# Patient Record
Sex: FEMALE | Race: WHITE | NOT HISPANIC OR LATINO | Employment: FULL TIME | URBAN - METROPOLITAN AREA
[De-identification: names, ages, dates, MRNs, and addresses within clinical notes are randomized per-mention and may not be internally consistent; named-entity substitution may affect disease eponyms.]

---

## 2023-02-28 LAB — URINE CULTURE: NORMAL

## 2023-03-22 DIAGNOSIS — F41.9 ANXIETY DISORDER, UNSPECIFIED TYPE: Primary | ICD-10-CM

## 2023-03-22 RX ORDER — FLUOXETINE 10 MG/1
10 CAPSULE ORAL DAILY
COMMUNITY
End: 2023-09-20 | Stop reason: SDUPTHER

## 2023-03-22 RX ORDER — FLUOXETINE 10 MG/1
CAPSULE ORAL
Qty: 30 CAPSULE | Refills: 0 | Status: SHIPPED | OUTPATIENT
Start: 2023-03-22 | End: 2023-04-20

## 2023-03-22 NOTE — TELEPHONE ENCOUNTER
Requested Prescriptions     Pending Prescriptions Disp Refills    FLUoxetine (PROzac) 10 mg capsule [Pharmacy Med Name: FLUoxetine HCl Oral Capsule 10 MG] 30 capsule 0     Sig: TAKE 1 CAPSULE BY MOUTH EVERY DAY

## 2023-04-19 DIAGNOSIS — F41.9 ANXIETY DISORDER, UNSPECIFIED TYPE: ICD-10-CM

## 2023-04-20 RX ORDER — FLUOXETINE 10 MG/1
CAPSULE ORAL
Qty: 30 CAPSULE | Refills: 0 | Status: SHIPPED | OUTPATIENT
Start: 2023-04-20 | End: 2023-05-23

## 2023-05-09 LAB
ALANINE AMINOTRANSFERASE (SGPT) (U/L) IN SER/PLAS: 17 U/L (ref 7–45)
ALBUMIN (G/DL) IN SER/PLAS: 4.1 G/DL (ref 3.4–5)
ALKALINE PHOSPHATASE (U/L) IN SER/PLAS: 69 U/L (ref 33–110)
ANION GAP IN SER/PLAS: 10 MMOL/L (ref 10–20)
ASPARTATE AMINOTRANSFERASE (SGOT) (U/L) IN SER/PLAS: 25 U/L (ref 9–39)
BASOPHILS (10*3/UL) IN BLOOD BY AUTOMATED COUNT: 0.03 X10E9/L (ref 0–0.1)
BASOPHILS/100 LEUKOCYTES IN BLOOD BY AUTOMATED COUNT: 0.4 % (ref 0–2)
BILIRUBIN TOTAL (MG/DL) IN SER/PLAS: 0.4 MG/DL (ref 0–1.2)
CALCIDIOL (25 OH VITAMIN D3) (NG/ML) IN SER/PLAS: 80 NG/ML
CALCIUM (MG/DL) IN SER/PLAS: 9.4 MG/DL (ref 8.6–10.3)
CARBON DIOXIDE, TOTAL (MMOL/L) IN SER/PLAS: 32 MMOL/L (ref 21–32)
CHLORIDE (MMOL/L) IN SER/PLAS: 101 MMOL/L (ref 98–107)
COBALAMIN (VITAMIN B12) (PG/ML) IN SER/PLAS: 2522 PG/ML (ref 211–911)
CREATININE (MG/DL) IN SER/PLAS: 0.69 MG/DL (ref 0.5–1.05)
EOSINOPHILS (10*3/UL) IN BLOOD BY AUTOMATED COUNT: 0.16 X10E9/L (ref 0–0.7)
EOSINOPHILS/100 LEUKOCYTES IN BLOOD BY AUTOMATED COUNT: 2.2 % (ref 0–6)
ERYTHROCYTE DISTRIBUTION WIDTH (RATIO) BY AUTOMATED COUNT: 12.4 % (ref 11.5–14.5)
ERYTHROCYTE MEAN CORPUSCULAR HEMOGLOBIN CONCENTRATION (G/DL) BY AUTOMATED: 33.1 G/DL (ref 32–36)
ERYTHROCYTE MEAN CORPUSCULAR VOLUME (FL) BY AUTOMATED COUNT: 95 FL (ref 80–100)
ERYTHROCYTES (10*6/UL) IN BLOOD BY AUTOMATED COUNT: 4.22 X10E12/L (ref 4–5.2)
FERRITIN (UG/LL) IN SER/PLAS: 171 UG/L (ref 8–150)
FOLATE (NG/ML) IN SER/PLAS: 14.9 NG/ML
GFR FEMALE: >90 ML/MIN/1.73M2
GLUCOSE (MG/DL) IN SER/PLAS: 93 MG/DL (ref 74–99)
HEMATOCRIT (%) IN BLOOD BY AUTOMATED COUNT: 40.2 % (ref 36–46)
HEMOGLOBIN (G/DL) IN BLOOD: 13.3 G/DL (ref 12–16)
IMMATURE GRANULOCYTES/100 LEUKOCYTES IN BLOOD BY AUTOMATED COUNT: 0.1 % (ref 0–0.9)
IRON (UG/DL) IN SER/PLAS: 85 UG/DL (ref 35–150)
IRON BINDING CAPACITY (UG/DL) IN SER/PLAS: 329 UG/DL (ref 240–445)
IRON SATURATION (%) IN SER/PLAS: 26 % (ref 25–45)
LEUKOCYTES (10*3/UL) IN BLOOD BY AUTOMATED COUNT: 7.3 X10E9/L (ref 4.4–11.3)
LYMPHOCYTES (10*3/UL) IN BLOOD BY AUTOMATED COUNT: 2.4 X10E9/L (ref 1.2–4.8)
LYMPHOCYTES/100 LEUKOCYTES IN BLOOD BY AUTOMATED COUNT: 33 % (ref 13–44)
MONOCYTES (10*3/UL) IN BLOOD BY AUTOMATED COUNT: 0.43 X10E9/L (ref 0.1–1)
MONOCYTES/100 LEUKOCYTES IN BLOOD BY AUTOMATED COUNT: 5.9 % (ref 2–10)
NEUTROPHILS (10*3/UL) IN BLOOD BY AUTOMATED COUNT: 4.25 X10E9/L (ref 1.2–7.7)
NEUTROPHILS/100 LEUKOCYTES IN BLOOD BY AUTOMATED COUNT: 58.4 % (ref 40–80)
PARATHYRIN INTACT (PG/ML) IN SER/PLAS: 53.2 PG/ML (ref 18.5–88)
PLATELETS (10*3/UL) IN BLOOD AUTOMATED COUNT: 294 X10E9/L (ref 150–450)
POTASSIUM (MMOL/L) IN SER/PLAS: 4 MMOL/L (ref 3.5–5.3)
PROTEIN TOTAL: 6.8 G/DL (ref 6.4–8.2)
SODIUM (MMOL/L) IN SER/PLAS: 139 MMOL/L (ref 136–145)
THYROTROPIN (MIU/L) IN SER/PLAS BY DETECTION LIMIT <= 0.05 MIU/L: 2.19 MIU/L (ref 0.44–3.98)
UREA NITROGEN (MG/DL) IN SER/PLAS: 19 MG/DL (ref 6–23)

## 2023-05-15 LAB — VITAMIN B1, WHOLE BLOOD: 215 NMOL/L (ref 70–180)

## 2023-06-20 DIAGNOSIS — F41.9 ANXIETY DISORDER, UNSPECIFIED TYPE: ICD-10-CM

## 2023-06-20 RX ORDER — FLUOXETINE 10 MG/1
CAPSULE ORAL
Qty: 30 CAPSULE | Refills: 3 | Status: SHIPPED | OUTPATIENT
Start: 2023-06-20 | End: 2023-11-07 | Stop reason: ALTCHOICE

## 2023-06-21 ENCOUNTER — TELEPHONE (OUTPATIENT)
Dept: PEDIATRICS | Facility: CLINIC | Age: 53
End: 2023-06-21
Payer: COMMERCIAL

## 2023-06-21 DIAGNOSIS — G43.109 MIGRAINE WITH AURA AND WITHOUT STATUS MIGRAINOSUS, NOT INTRACTABLE: ICD-10-CM

## 2023-06-21 PROBLEM — R94.31 ABNORMAL EKG: Status: RESOLVED | Noted: 2023-06-21 | Resolved: 2023-06-21

## 2023-06-21 PROBLEM — H93.11 TINNITUS OF RIGHT EAR: Status: RESOLVED | Noted: 2023-06-21 | Resolved: 2023-06-21

## 2023-06-21 PROBLEM — R79.89 ABNORMAL TSH: Status: RESOLVED | Noted: 2023-06-21 | Resolved: 2023-06-21

## 2023-06-21 PROBLEM — Z98.84 S/P LAPAROSCOPIC SLEEVE GASTRECTOMY: Status: ACTIVE | Noted: 2023-06-21

## 2023-06-21 PROBLEM — G47.30 SLEEP APNEA: Status: RESOLVED | Noted: 2023-06-21 | Resolved: 2023-06-21

## 2023-06-21 PROBLEM — K91.2 POSTOPERATIVE MALABSORPTION (HHS-HCC): Status: RESOLVED | Noted: 2023-06-21 | Resolved: 2023-06-21

## 2023-06-21 PROBLEM — K44.9 SLIDING HIATAL HERNIA: Status: ACTIVE | Noted: 2023-06-21

## 2023-06-21 PROBLEM — I10 BENIGN ESSENTIAL HTN: Status: ACTIVE | Noted: 2023-06-21

## 2023-06-21 PROBLEM — N64.9 BREAST LESION: Status: RESOLVED | Noted: 2023-06-21 | Resolved: 2023-06-21

## 2023-06-21 PROBLEM — I35.0 MILD AORTIC STENOSIS: Status: ACTIVE | Noted: 2023-06-21

## 2023-06-21 PROBLEM — H90.3 ASYMMETRIC SNHL (SENSORINEURAL HEARING LOSS): Status: ACTIVE | Noted: 2023-06-21

## 2023-06-21 PROBLEM — E55.9 VITAMIN D DEFICIENCY: Status: ACTIVE | Noted: 2023-06-21

## 2023-06-21 PROBLEM — Q23.81 BICUSPID AORTIC VALVE: Status: ACTIVE | Noted: 2023-06-21

## 2023-06-21 PROBLEM — R42 VERTIGO: Status: ACTIVE | Noted: 2023-06-21

## 2023-06-21 PROBLEM — R53.83 FATIGUE: Status: RESOLVED | Noted: 2023-06-21 | Resolved: 2023-06-21

## 2023-06-21 PROBLEM — Z98.890 POST-OPERATIVE NAUSEA AND VOMITING: Status: RESOLVED | Noted: 2023-06-21 | Resolved: 2023-06-21

## 2023-06-21 PROBLEM — K21.9 GERD (GASTROESOPHAGEAL REFLUX DISEASE): Status: ACTIVE | Noted: 2023-06-21

## 2023-06-21 PROBLEM — R11.2 POST-OPERATIVE NAUSEA AND VOMITING: Status: RESOLVED | Noted: 2023-06-21 | Resolved: 2023-06-21

## 2023-06-21 PROBLEM — M26.659 TMJ ARTHROPATHY: Status: ACTIVE | Noted: 2023-06-21

## 2023-06-21 PROBLEM — Z98.84 BARIATRIC SURGERY STATUS: Status: RESOLVED | Noted: 2023-06-21 | Resolved: 2023-06-21

## 2023-06-21 PROBLEM — E78.1 HYPERTRIGLYCERIDEMIA: Status: ACTIVE | Noted: 2023-06-21

## 2023-06-21 PROBLEM — U07.1 COVID-19 VIRUS INFECTION: Status: RESOLVED | Noted: 2023-06-21 | Resolved: 2023-06-21

## 2023-06-21 PROBLEM — E78.00 PURE HYPERCHOLESTEROLEMIA: Status: ACTIVE | Noted: 2023-06-21

## 2023-06-21 PROBLEM — R07.89 ATYPICAL CHEST PAIN: Status: RESOLVED | Noted: 2023-06-21 | Resolved: 2023-06-21

## 2023-06-21 PROBLEM — N83.201 CYST OF RIGHT OVARY: Status: ACTIVE | Noted: 2023-06-21

## 2023-06-21 PROBLEM — R29.818 SUSPECTED SLEEP APNEA: Status: RESOLVED | Noted: 2023-06-21 | Resolved: 2023-06-21

## 2023-06-21 PROBLEM — J01.90 SUBACUTE SINUSITIS: Status: RESOLVED | Noted: 2023-06-21 | Resolved: 2023-06-21

## 2023-06-21 PROBLEM — D64.9 ANEMIA: Status: ACTIVE | Noted: 2023-06-21

## 2023-06-21 PROBLEM — Q23.1 BICUSPID AORTIC VALVE (HHS-HCC): Status: ACTIVE | Noted: 2023-06-21

## 2023-06-21 PROBLEM — R10.9 FLANK PAIN, ACUTE: Status: RESOLVED | Noted: 2023-06-21 | Resolved: 2023-06-21

## 2023-06-21 PROBLEM — J06.9 URTI (ACUTE UPPER RESPIRATORY INFECTION): Status: RESOLVED | Noted: 2023-06-21 | Resolved: 2023-06-21

## 2023-06-21 PROBLEM — R31.9 HEMATURIA: Status: RESOLVED | Noted: 2023-06-21 | Resolved: 2023-06-21

## 2023-06-21 PROBLEM — I49.3 PVC (PREMATURE VENTRICULAR CONTRACTION): Status: ACTIVE | Noted: 2023-06-21

## 2023-06-21 RX ORDER — SUMATRIPTAN 50 MG/1
50 TABLET, FILM COATED ORAL ONCE AS NEEDED
Qty: 9 TABLET | Refills: 2 | Status: SHIPPED | OUTPATIENT
Start: 2023-06-21 | End: 2023-09-20 | Stop reason: SDUPTHER

## 2023-06-21 RX ORDER — SUMATRIPTAN 50 MG/1
TABLET, FILM COATED ORAL
COMMUNITY
Start: 2018-07-19 | End: 2023-06-21 | Stop reason: SDUPTHER

## 2023-06-21 NOTE — TELEPHONE ENCOUNTER
Requested Prescriptions     Pending Prescriptions Disp Refills    SUMAtriptan (Imitrex) 50 mg tablet 9 tablet 2     Sig: Take 1 tablet (50 mg) by mouth 1 time if needed for migraine.

## 2023-08-20 DIAGNOSIS — E78.6 HYPOCHOLESTEROLEMIA: ICD-10-CM

## 2023-08-21 RX ORDER — ROSUVASTATIN CALCIUM 20 MG/1
TABLET, COATED ORAL
Qty: 90 TABLET | Refills: 0 | Status: SHIPPED | OUTPATIENT
Start: 2023-08-21 | End: 2023-12-04 | Stop reason: SDUPTHER

## 2023-09-11 LAB — URINE CULTURE: NO GROWTH

## 2023-09-14 RX ORDER — MELOXICAM 15 MG/1
1 TABLET ORAL DAILY PRN
COMMUNITY
Start: 2023-06-06 | End: 2023-11-07 | Stop reason: ALTCHOICE

## 2023-09-14 RX ORDER — TRIAMCINOLONE ACETONIDE 55 UG/1
2 SPRAY, METERED NASAL DAILY
COMMUNITY
Start: 2022-03-08 | End: 2023-11-07 | Stop reason: ALTCHOICE

## 2023-09-14 RX ORDER — ATORVASTATIN CALCIUM 10 MG/1
1 TABLET, FILM COATED ORAL DAILY
COMMUNITY
Start: 2022-09-02 | End: 2023-09-20 | Stop reason: ALTCHOICE

## 2023-09-14 RX ORDER — ASCORBIC ACID 125 MG
2 TABLET,CHEWABLE ORAL DAILY
COMMUNITY
End: 2024-06-03 | Stop reason: WASHOUT

## 2023-09-14 RX ORDER — NITROFURANTOIN 25; 75 MG/1; MG/1
1 CAPSULE ORAL 2 TIMES DAILY
COMMUNITY
Start: 2023-02-27 | End: 2023-09-20 | Stop reason: ALTCHOICE

## 2023-09-14 RX ORDER — METOPROLOL SUCCINATE 100 MG/1
1 TABLET, EXTENDED RELEASE ORAL DAILY
COMMUNITY
Start: 2018-07-19 | End: 2023-11-09 | Stop reason: DRUGHIGH

## 2023-09-14 RX ORDER — PROMETHAZINE HYDROCHLORIDE 25 MG/1
.25-.5 TABLET ORAL SEE ADMIN INSTRUCTIONS
COMMUNITY
Start: 2023-02-12 | End: 2023-09-20 | Stop reason: ALTCHOICE

## 2023-09-14 RX ORDER — ELECTROLYTES/DEXTROSE
1 SOLUTION, ORAL ORAL DAILY
COMMUNITY

## 2023-09-14 RX ORDER — VIT C/E/ZN/COPPR/LUTEIN/ZEAXAN 250MG-90MG
1 CAPSULE ORAL 2 TIMES DAILY
COMMUNITY

## 2023-09-14 RX ORDER — POLYETHYLENE GLYCOL 3350 17 G/17G
17 POWDER, FOR SOLUTION ORAL
COMMUNITY
Start: 2020-12-21 | End: 2023-11-07 | Stop reason: ALTCHOICE

## 2023-09-20 ENCOUNTER — OFFICE VISIT (OUTPATIENT)
Dept: PRIMARY CARE | Facility: CLINIC | Age: 53
End: 2023-09-20
Payer: COMMERCIAL

## 2023-09-20 ENCOUNTER — LAB (OUTPATIENT)
Dept: LAB | Facility: LAB | Age: 53
End: 2023-09-20
Payer: COMMERCIAL

## 2023-09-20 VITALS
RESPIRATION RATE: 20 BRPM | WEIGHT: 178 LBS | BODY MASS INDEX: 28.61 KG/M2 | TEMPERATURE: 97.5 F | HEIGHT: 66 IN | HEART RATE: 56 BPM | SYSTOLIC BLOOD PRESSURE: 96 MMHG | DIASTOLIC BLOOD PRESSURE: 68 MMHG

## 2023-09-20 DIAGNOSIS — Z00.00 ENCOUNTER FOR ANNUAL PHYSICAL EXAM: ICD-10-CM

## 2023-09-20 DIAGNOSIS — G43.109 MIGRAINE WITH AURA AND WITHOUT STATUS MIGRAINOSUS, NOT INTRACTABLE: ICD-10-CM

## 2023-09-20 DIAGNOSIS — I10 BENIGN ESSENTIAL HTN: Primary | ICD-10-CM

## 2023-09-20 DIAGNOSIS — R31.0 GROSS HEMATURIA: ICD-10-CM

## 2023-09-20 DIAGNOSIS — Z23 IMMUNIZATION DUE: ICD-10-CM

## 2023-09-20 DIAGNOSIS — E66.3 OVERWEIGHT (BMI 25.0-29.9): ICD-10-CM

## 2023-09-20 DIAGNOSIS — Z13.31 SCREENING FOR DEPRESSION: ICD-10-CM

## 2023-09-20 DIAGNOSIS — I10 BENIGN ESSENTIAL HTN: ICD-10-CM

## 2023-09-20 LAB
ALANINE AMINOTRANSFERASE (SGPT) (U/L) IN SER/PLAS: 16 U/L (ref 7–45)
ALBUMIN (G/DL) IN SER/PLAS: 4.4 G/DL (ref 3.4–5)
ALKALINE PHOSPHATASE (U/L) IN SER/PLAS: 82 U/L (ref 33–110)
ANION GAP IN SER/PLAS: 23 MMOL/L (ref 10–20)
ASPARTATE AMINOTRANSFERASE (SGOT) (U/L) IN SER/PLAS: 28 U/L (ref 9–39)
BILIRUBIN TOTAL (MG/DL) IN SER/PLAS: 0.5 MG/DL (ref 0–1.2)
CALCIDIOL (25 OH VITAMIN D3) (NG/ML) IN SER/PLAS: 84 NG/ML
CALCIUM (MG/DL) IN SER/PLAS: 10.1 MG/DL (ref 8.6–10.3)
CARBON DIOXIDE, TOTAL (MMOL/L) IN SER/PLAS: 25 MMOL/L (ref 21–32)
CHLORIDE (MMOL/L) IN SER/PLAS: 102 MMOL/L (ref 98–107)
CHOLESTEROL (MG/DL) IN SER/PLAS: 217 MG/DL (ref 0–199)
CHOLESTEROL IN HDL (MG/DL) IN SER/PLAS: 59.3 MG/DL
CHOLESTEROL/HDL RATIO: 3.7
COBALAMIN (VITAMIN B12) (PG/ML) IN SER/PLAS: 1157 PG/ML (ref 211–911)
CREATININE (MG/DL) IN SER/PLAS: 0.85 MG/DL (ref 0.5–1.05)
ERYTHROCYTE DISTRIBUTION WIDTH (RATIO) BY AUTOMATED COUNT: 12.5 % (ref 11.5–14.5)
ERYTHROCYTE MEAN CORPUSCULAR HEMOGLOBIN CONCENTRATION (G/DL) BY AUTOMATED: 33.1 G/DL (ref 32–36)
ERYTHROCYTE MEAN CORPUSCULAR VOLUME (FL) BY AUTOMATED COUNT: 95 FL (ref 80–100)
ERYTHROCYTES (10*6/UL) IN BLOOD BY AUTOMATED COUNT: 4.42 X10E12/L (ref 4–5.2)
FOLATE (NG/ML) IN SER/PLAS: >22.3 NG/ML
GFR FEMALE: 81 ML/MIN/1.73M2
GLUCOSE (MG/DL) IN SER/PLAS: 86 MG/DL (ref 74–99)
HEMATOCRIT (%) IN BLOOD BY AUTOMATED COUNT: 42 % (ref 36–46)
HEMOGLOBIN (G/DL) IN BLOOD: 13.9 G/DL (ref 12–16)
IRON (UG/DL) IN SER/PLAS: 132 UG/DL (ref 35–150)
IRON BINDING CAPACITY (UG/DL) IN SER/PLAS: 336 UG/DL (ref 240–445)
IRON SATURATION (%) IN SER/PLAS: 39 % (ref 25–45)
LDL: 123 MG/DL (ref 0–99)
LEUKOCYTES (10*3/UL) IN BLOOD BY AUTOMATED COUNT: 9.3 X10E9/L (ref 4.4–11.3)
PLATELETS (10*3/UL) IN BLOOD AUTOMATED COUNT: 312 X10E9/L (ref 150–450)
POC APPEARANCE, URINE: CLEAR
POC BILIRUBIN, URINE: NEGATIVE
POC BLOOD, URINE: NEGATIVE
POC COLOR, URINE: YELLOW
POC GLUCOSE, URINE: NEGATIVE MG/DL
POC KETONES, URINE: NEGATIVE MG/DL
POC LEUKOCYTES, URINE: NEGATIVE
POC NITRITE,URINE: NEGATIVE
POC PH, URINE: 7.5 PH
POC PROTEIN, URINE: NEGATIVE MG/DL
POC SPECIFIC GRAVITY, URINE: <=1.005
POC UROBILINOGEN, URINE: 0.2 EU/DL
POTASSIUM (MMOL/L) IN SER/PLAS: 5 MMOL/L (ref 3.5–5.3)
PROTEIN TOTAL: 6.9 G/DL (ref 6.4–8.2)
SODIUM (MMOL/L) IN SER/PLAS: 145 MMOL/L (ref 136–145)
THYROTROPIN (MIU/L) IN SER/PLAS BY DETECTION LIMIT <= 0.05 MIU/L: 2.96 MIU/L (ref 0.44–3.98)
TRIGLYCERIDE (MG/DL) IN SER/PLAS: 175 MG/DL (ref 0–149)
UREA NITROGEN (MG/DL) IN SER/PLAS: 17 MG/DL (ref 6–23)
VLDL: 35 MG/DL (ref 0–40)

## 2023-09-20 PROCEDURE — 36415 COLL VENOUS BLD VENIPUNCTURE: CPT

## 2023-09-20 PROCEDURE — 80053 COMPREHEN METABOLIC PANEL: CPT

## 2023-09-20 PROCEDURE — 83540 ASSAY OF IRON: CPT

## 2023-09-20 PROCEDURE — 87086 URINE CULTURE/COLONY COUNT: CPT

## 2023-09-20 PROCEDURE — 82746 ASSAY OF FOLIC ACID SERUM: CPT

## 2023-09-20 PROCEDURE — 83550 IRON BINDING TEST: CPT

## 2023-09-20 PROCEDURE — 85027 COMPLETE CBC AUTOMATED: CPT

## 2023-09-20 PROCEDURE — 82607 VITAMIN B-12: CPT

## 2023-09-20 PROCEDURE — 96127 BRIEF EMOTIONAL/BEHAV ASSMT: CPT | Performed by: FAMILY MEDICINE

## 2023-09-20 PROCEDURE — 80061 LIPID PANEL: CPT

## 2023-09-20 PROCEDURE — 90750 HZV VACC RECOMBINANT IM: CPT | Performed by: FAMILY MEDICINE

## 2023-09-20 PROCEDURE — 99396 PREV VISIT EST AGE 40-64: CPT | Performed by: FAMILY MEDICINE

## 2023-09-20 PROCEDURE — 82306 VITAMIN D 25 HYDROXY: CPT

## 2023-09-20 PROCEDURE — 81002 URINALYSIS NONAUTO W/O SCOPE: CPT | Performed by: FAMILY MEDICINE

## 2023-09-20 PROCEDURE — 84443 ASSAY THYROID STIM HORMONE: CPT

## 2023-09-20 PROCEDURE — 90471 IMMUNIZATION ADMIN: CPT | Performed by: FAMILY MEDICINE

## 2023-09-20 RX ORDER — SUMATRIPTAN 50 MG/1
50 TABLET, FILM COATED ORAL ONCE AS NEEDED
Qty: 9 TABLET | Refills: 2 | Status: SHIPPED | OUTPATIENT
Start: 2023-09-20 | End: 2024-03-04

## 2023-09-20 ASSESSMENT — ENCOUNTER SYMPTOMS
SLEEP DISTURBANCE: 0
NERVOUS/ANXIOUS: 1
FATIGUE: 1
BRUISES/BLEEDS EASILY: 0
VOMITING: 0
NUMBNESS: 0
FLANK PAIN: 0
DIFFICULTY URINATING: 0
SORE THROAT: 0
NAUSEA: 0
POLYDIPSIA: 1
SHORTNESS OF BREATH: 0
DYSPHORIC MOOD: 0
BLOOD IN STOOL: 0
DYSURIA: 0
ARTHRALGIAS: 0
MYALGIAS: 1
FEVER: 0
FREQUENCY: 1
COUGH: 0
RHINORRHEA: 0
DIARRHEA: 0
CONSTIPATION: 0
WEAKNESS: 0
HEMATURIA: 1
WHEEZING: 0
PALPITATIONS: 0

## 2023-09-20 ASSESSMENT — PATIENT HEALTH QUESTIONNAIRE - PHQ9
2. FEELING DOWN, DEPRESSED OR HOPELESS: NOT AT ALL
SUM OF ALL RESPONSES TO PHQ9 QUESTIONS 1 AND 2: 0
1. LITTLE INTEREST OR PLEASURE IN DOING THINGS: NOT AT ALL

## 2023-09-20 ASSESSMENT — LIFESTYLE VARIABLES: HOW OFTEN DO YOU HAVE A DRINK CONTAINING ALCOHOL: 2-4 TIMES A MONTH

## 2023-09-20 NOTE — PROGRESS NOTES
"+Subjective   Patient ID: Jolene Rodgers is a 52 y.o. female who presents for Annual Exam.    HPI     Review of Systems   Constitutional:  Positive for fatigue. Negative for fever.   HENT:  Negative for congestion, ear pain, hearing loss, rhinorrhea and sore throat.    Eyes:  Negative for visual disturbance.   Respiratory:  Negative for cough, shortness of breath and wheezing.    Cardiovascular:  Negative for chest pain and palpitations.        Sees card / tim, annually .   Gastrointestinal:  Negative for blood in stool, constipation, diarrhea, nausea and vomiting.   Endocrine: Positive for polydipsia and polyuria. Negative for cold intolerance and heat intolerance.   Genitourinary:  Positive for frequency, hematuria and urgency. Negative for difficulty urinating, dysuria, flank pain, vaginal bleeding and vaginal discharge.        Pt noted blood in urine? Went to  and had ua done, there was no blood in urine.   Has been more sexually active.has has discomfort with sex. Had hyst.    Musculoskeletal:  Positive for myalgias. Negative for arthralgias.   Skin:  Negative for rash.   Neurological:  Negative for weakness and numbness.   Hematological:  Does not bruise/bleed easily.   Psychiatric/Behavioral:  Negative for dysphoric mood, sleep disturbance and suicidal ideas. The patient is nervous/anxious.         On fluoxetine for anxiety       Objective   BP 96/68   Pulse 56   Temp 36.4 °C (97.5 °F)   Resp 20   Ht 1.676 m (5' 6\")   Wt 80.7 kg (178 lb)   BMI 28.73 kg/m²     Physical Exam  Vitals and nursing note reviewed.   Constitutional:       General: She is not in acute distress.     Appearance: Normal appearance.   HENT:      Head: Normocephalic and atraumatic.      Right Ear: Tympanic membrane, ear canal and external ear normal.      Left Ear: Tympanic membrane, ear canal and external ear normal.      Nose: Nose normal.      Mouth/Throat:      Mouth: Mucous membranes are moist.      Pharynx: Oropharynx " is clear.   Eyes:      Extraocular Movements: Extraocular movements intact.      Conjunctiva/sclera: Conjunctivae normal.      Pupils: Pupils are equal, round, and reactive to light.   Neck:      Vascular: No carotid bruit.   Cardiovascular:      Rate and Rhythm: Normal rate and regular rhythm.      Heart sounds: Murmur heard.      Comments: Has bicuspid aortic valve and stenosis  Pulmonary:      Effort: Pulmonary effort is normal.      Breath sounds: Normal breath sounds.   Abdominal:      General: Abdomen is flat. Bowel sounds are normal.      Palpations: Abdomen is soft.      Tenderness: There is no abdominal tenderness. There is no right CVA tenderness or left CVA tenderness.   Musculoskeletal:         General: No deformity.      Cervical back: Neck supple.   Lymphadenopathy:      Cervical: No cervical adenopathy.   Skin:     General: Skin is warm and dry.   Neurological:      General: No focal deficit present.      Mental Status: She is alert.   Psychiatric:         Mood and Affect: Mood normal.         Behavior: Behavior normal.         Assessment/Plan   Problem List Items Addressed This Visit       Benign essential HTN - Primary     Pt on metoprolol med well tolerated, has been stable         Relevant Orders    Comprehensive Metabolic Panel (Completed)    CBC (Completed)    Migraine with aura and without status migrainosus, not intractable    Relevant Medications    SUMAtriptan (Imitrex) 50 mg tablet    Encounter for annual physical exam    Relevant Orders    Lipid Panel (Completed)    TSH with reflex to Free T4 if abnormal (Completed)    Vitamin D 25-Hydroxy,Total (Completed)    BI mammo bilateral screening tomosynthesis    Folate (Completed)    Vitamin B12 (Completed)    Iron and TIBC (Completed)    Screening for depression    Overweight (BMI 25.0-29.9)     Advise healthy diet and exercise  Pt is post sleeve gastrectomy         Gross hematuria    Relevant Orders    POCT UA (nonautomated) manually resulted  (Completed)    Urine Culture     Other Visit Diagnoses       Immunization due            Shingrix io today       Patient reported health Good    Regular Dental Visits yes    Regular Eye Visits yes    Hearing Loss no    Balanced Diet yes    Weight Concerns no    Exercise Regular

## 2023-09-21 LAB — URINE CULTURE: NORMAL

## 2023-09-22 ENCOUNTER — TELEPHONE (OUTPATIENT)
Dept: PRIMARY CARE | Facility: CLINIC | Age: 53
End: 2023-09-22
Payer: COMMERCIAL

## 2023-09-22 NOTE — TELEPHONE ENCOUNTER
MD Teri Morse, CMA  Call pt, chol panel is borderline elevated, cont rosuvastatin and cont low fat/chol diet and exercise  B12 level is elevated, have pt stop any otc vitamin supplements      Sent msg in My Chart

## 2023-10-03 ENCOUNTER — ANCILLARY PROCEDURE (OUTPATIENT)
Dept: RADIOLOGY | Facility: CLINIC | Age: 53
End: 2023-10-03
Payer: COMMERCIAL

## 2023-10-03 DIAGNOSIS — Z00.00 ENCOUNTER FOR ANNUAL PHYSICAL EXAM: ICD-10-CM

## 2023-10-03 PROCEDURE — 77067 SCR MAMMO BI INCL CAD: CPT | Mod: BILATERAL PROCEDURE | Performed by: RADIOLOGY

## 2023-10-03 PROCEDURE — 77067 SCR MAMMO BI INCL CAD: CPT

## 2023-10-03 PROCEDURE — 77063 BREAST TOMOSYNTHESIS BI: CPT | Mod: BILATERAL PROCEDURE | Performed by: RADIOLOGY

## 2023-10-16 ENCOUNTER — APPOINTMENT (OUTPATIENT)
Dept: SURGERY | Facility: CLINIC | Age: 53
End: 2023-10-16
Payer: COMMERCIAL

## 2023-10-19 ENCOUNTER — APPOINTMENT (OUTPATIENT)
Dept: SURGERY | Facility: CLINIC | Age: 53
End: 2023-10-19
Payer: COMMERCIAL

## 2023-11-07 ENCOUNTER — LAB (OUTPATIENT)
Dept: LAB | Facility: LAB | Age: 53
End: 2023-11-07
Payer: COMMERCIAL

## 2023-11-07 ENCOUNTER — OFFICE VISIT (OUTPATIENT)
Dept: OBSTETRICS AND GYNECOLOGY | Facility: CLINIC | Age: 53
End: 2023-11-07
Payer: COMMERCIAL

## 2023-11-07 VITALS
DIASTOLIC BLOOD PRESSURE: 80 MMHG | BODY MASS INDEX: 28.77 KG/M2 | SYSTOLIC BLOOD PRESSURE: 116 MMHG | WEIGHT: 179 LBS | HEIGHT: 66 IN

## 2023-11-07 DIAGNOSIS — N76.0 ACUTE VAGINITIS: ICD-10-CM

## 2023-11-07 DIAGNOSIS — L72.0 EPIDERMOID CYST OF SKIN OF INGUINAL REGION: ICD-10-CM

## 2023-11-07 DIAGNOSIS — N94.10 DYSPAREUNIA IN FEMALE: ICD-10-CM

## 2023-11-07 DIAGNOSIS — Z11.3 SCREENING FOR STDS (SEXUALLY TRANSMITTED DISEASES): ICD-10-CM

## 2023-11-07 DIAGNOSIS — Z11.3 SCREENING FOR STDS (SEXUALLY TRANSMITTED DISEASES): Primary | ICD-10-CM

## 2023-11-07 PROCEDURE — 87389 HIV-1 AG W/HIV-1&-2 AB AG IA: CPT

## 2023-11-07 PROCEDURE — 86803 HEPATITIS C AB TEST: CPT

## 2023-11-07 PROCEDURE — 3074F SYST BP LT 130 MM HG: CPT | Performed by: ADVANCED PRACTICE MIDWIFE

## 2023-11-07 PROCEDURE — 36415 COLL VENOUS BLD VENIPUNCTURE: CPT

## 2023-11-07 PROCEDURE — 3078F DIAST BP <80 MM HG: CPT | Performed by: ADVANCED PRACTICE MIDWIFE

## 2023-11-07 PROCEDURE — 87800 DETECT AGNT MULT DNA DIREC: CPT

## 2023-11-07 PROCEDURE — 87205 SMEAR GRAM STAIN: CPT

## 2023-11-07 PROCEDURE — 1036F TOBACCO NON-USER: CPT | Performed by: ADVANCED PRACTICE MIDWIFE

## 2023-11-07 PROCEDURE — 87340 HEPATITIS B SURFACE AG IA: CPT

## 2023-11-07 PROCEDURE — 99203 OFFICE O/P NEW LOW 30 MIN: CPT | Performed by: ADVANCED PRACTICE MIDWIFE

## 2023-11-07 PROCEDURE — 86780 TREPONEMA PALLIDUM: CPT

## 2023-11-07 RX ORDER — ESTRADIOL 10 UG/1
10 INSERT VAGINAL NIGHTLY
Qty: 18 TABLET | Refills: 3 | Status: SHIPPED | OUTPATIENT
Start: 2023-11-07

## 2023-11-07 ASSESSMENT — ENCOUNTER SYMPTOMS
CARDIOVASCULAR NEGATIVE: 0
CONSTITUTIONAL NEGATIVE: 0
HEMATOLOGIC/LYMPHATIC NEGATIVE: 0
HEADACHES: 1
NEUROLOGICAL NEGATIVE: 1
BACK PAIN: 1
RESPIRATORY NEGATIVE: 0
ALLERGIC/IMMUNOLOGIC NEGATIVE: 0
EYES NEGATIVE: 0
ENDOCRINE NEGATIVE: 0
MUSCULOSKELETAL NEGATIVE: 1
PSYCHIATRIC NEGATIVE: 0
GASTROINTESTINAL NEGATIVE: 0

## 2023-11-07 NOTE — PROGRESS NOTES
S:  Patient having pain during intercourse and vaginal pressure.  Patient stated she had bleeding once after intercourse (day after).  Patient stated she has a sore vaginal salazar (has had it for a few months) getting bigger.  Patient would also like GC/CT testing and routine LAB STD testing.   Patient stated her ex  was not faithful within the last couple of month.    Review of Systems   Unable to perform ROS: Other (Leaking urine)   Genitourinary:  Positive for genital sores.   Musculoskeletal:  Positive for back pain.   Neurological:  Positive for headaches.   All other systems reviewed and are negative.    O:  Physical Exam  Constitutional:       General: She is not in acute distress.  Pulmonary:      Effort: Pulmonary effort is normal.   Genitourinary:     Vagina: Normal.      Uterus: Absent.       Adnexa: Right adnexa normal and left adnexa normal.          Comments: Vulva raisin size mass R groin, firm with defined borders, no erythema or drainage  Mildly atrophic vagina, cervix absent with cuff, no bleeding  Skin:     General: Skin is warm and dry.      Findings: No lesion.   Neurological:      Mental Status: She is alert.          BACILIO Mayers    Jolene was seen today for vaginal bleeding, pain during intercourse and std testing.  Diagnoses and all orders for this visit:  Screening for STDs (sexually transmitted diseases) (Primary)  -     C. trachomatis + N. gonorrhoeae, Amplified  -     HIV-1 and HIV-2 antibodies; Future  -     Syphilis Screen with Reflex; Future  -     Hepatitis C Antibody; Future  -     Hepatitis B surface Ag; Future  Acute vaginitis  -     Vaginitis Gram Stain For Bacterial Vaginosis + Yeast  Dyspareunia in female  -     estradiol (Vagifem) 10 mcg tablet vaginal tablet; Insert 1 tablet (10 mcg) into the vagina once daily at bedtime. Insert 1 tablet into vagina at bedtime for 2 weeks, then at bedtime twice a week.  -     Referral to Physical Therapy; Future  Epidermoid  cyst of skin of inguinal region   See physician to consider removal    Follow up 6 weeks

## 2023-11-08 LAB
BACTERIAL VAGINOSIS VAG-IMP: NORMAL
C TRACH RRNA SPEC QL NAA+PROBE: NEGATIVE
CLUE CELLS VAG LPF-#/AREA: NORMAL /[LPF]
HBV SURFACE AG SERPL QL IA: NONREACTIVE
HCV AB SER QL: NONREACTIVE
HIV 1+2 AB+HIV1 P24 AG SERPL QL IA: NONREACTIVE
N GONORRHOEA DNA SPEC QL PROBE+SIG AMP: NEGATIVE
NUGENT SCORE: 4
T PALLIDUM AB SER QL: NONREACTIVE
YEAST VAG WET PREP-#/AREA: NORMAL

## 2023-11-09 ENCOUNTER — OFFICE VISIT (OUTPATIENT)
Dept: CARDIOLOGY | Facility: CLINIC | Age: 53
End: 2023-11-09
Payer: COMMERCIAL

## 2023-11-09 VITALS
HEART RATE: 56 BPM | SYSTOLIC BLOOD PRESSURE: 94 MMHG | HEIGHT: 66 IN | TEMPERATURE: 97.5 F | WEIGHT: 177 LBS | BODY MASS INDEX: 28.45 KG/M2 | DIASTOLIC BLOOD PRESSURE: 64 MMHG

## 2023-11-09 DIAGNOSIS — Q23.1 BICUSPID AORTIC VALVE (HHS-HCC): ICD-10-CM

## 2023-11-09 DIAGNOSIS — I10 BENIGN ESSENTIAL HTN: ICD-10-CM

## 2023-11-09 DIAGNOSIS — I35.0 MILD AORTIC STENOSIS: Primary | ICD-10-CM

## 2023-11-09 DIAGNOSIS — I49.3 PVC (PREMATURE VENTRICULAR CONTRACTION): ICD-10-CM

## 2023-11-09 DIAGNOSIS — E78.00 PURE HYPERCHOLESTEROLEMIA: ICD-10-CM

## 2023-11-09 PROBLEM — Z78.9 NEVER SMOKED ANY SUBSTANCE: Status: ACTIVE | Noted: 2023-11-09

## 2023-11-09 PROCEDURE — 3078F DIAST BP <80 MM HG: CPT | Performed by: INTERNAL MEDICINE

## 2023-11-09 PROCEDURE — 3008F BODY MASS INDEX DOCD: CPT | Performed by: INTERNAL MEDICINE

## 2023-11-09 PROCEDURE — 1036F TOBACCO NON-USER: CPT | Performed by: INTERNAL MEDICINE

## 2023-11-09 PROCEDURE — 3074F SYST BP LT 130 MM HG: CPT | Performed by: INTERNAL MEDICINE

## 2023-11-09 PROCEDURE — 99214 OFFICE O/P EST MOD 30 MIN: CPT | Performed by: INTERNAL MEDICINE

## 2023-11-09 PROCEDURE — 93000 ELECTROCARDIOGRAM COMPLETE: CPT | Performed by: INTERNAL MEDICINE

## 2023-11-09 RX ORDER — METOPROLOL SUCCINATE 50 MG/1
50 TABLET, EXTENDED RELEASE ORAL DAILY
Qty: 90 TABLET | Refills: 3 | Status: SHIPPED | OUTPATIENT
Start: 2023-11-09 | End: 2024-11-08

## 2023-11-09 ASSESSMENT — ENCOUNTER SYMPTOMS
PALPITATIONS: 1
ALLERGIC/IMMUNOLOGIC NEGATIVE: 1
HEMATOLOGIC/LYMPHATIC NEGATIVE: 1
NEUROLOGICAL NEGATIVE: 1
CONSTITUTIONAL NEGATIVE: 1
ENDOCRINE NEGATIVE: 1
GASTROINTESTINAL NEGATIVE: 1
RESPIRATORY NEGATIVE: 1
BACK PAIN: 1
PSYCHIATRIC NEGATIVE: 1
EYES NEGATIVE: 1

## 2023-11-09 ASSESSMENT — PATIENT HEALTH QUESTIONNAIRE - PHQ9
SUM OF ALL RESPONSES TO PHQ9 QUESTIONS 1 AND 2: 0
2. FEELING DOWN, DEPRESSED OR HOPELESS: NOT AT ALL
1. LITTLE INTEREST OR PLEASURE IN DOING THINGS: NOT AT ALL

## 2023-11-09 NOTE — PROGRESS NOTES
"  Patient:  Jolene SIGALA  YOB: 1970  MRN: [id number]       Chief Complaint/Active Symptoms:       Jolene SIGALA is a 52 y.o. female who returns today for cardiac follow-up.    NO problems over the past year. Has lost 133 lbs over the past 18 months. Has noted low bp readings. Is exercising without any problems.     No chest pain or discomfort. Has an occasional palpitations - more so when she is not eating \"enough\" and she monitors that more closely now. No syncope or near syncope. No shortness of breath, orthopnea or PND.     No questions or concerns.     Review of Systems   Constitutional: Negative.    HENT: Negative.     Eyes: Negative.    Respiratory: Negative.     Cardiovascular:  Positive for palpitations. Negative for chest pain and leg swelling.   Gastrointestinal: Negative.    Endocrine: Negative.    Genitourinary:  Positive for pelvic pain (Pressure).   Musculoskeletal:  Positive for back pain.   Skin: Negative.    Allergic/Immunologic: Negative.    Neurological: Negative.    Hematological: Negative.    Psychiatric/Behavioral: Negative.     All other systems reviewed and are negative.      Objective:     Vitals:    11/09/23 0837   BP: 94/64   Pulse: 56   Temp: 36.4 °C (97.5 °F)       Vitals:    11/09/23 0837   Weight: 80.3 kg (177 lb)       Allergies:     No Known Allergies       Medications:     Current Outpatient Medications   Medication Instructions    biotin 5 mg capsule 1 capsule, oral, Daily, take po as directed    calcium citrate/vitamin D2 (BRIJESH-CITRATE ORAL) 1 tablet, oral, take po as directed    CALCIUM ORAL oral    cholecalciferol (Vitamin D-3) 25 MCG (1000 UT) capsule 1 capsule, oral, 2 times daily, take PO BID    estradiol (VAGIFEM) 10 mcg, vaginal, Nightly, Insert 1 tablet into vagina at bedtime for 2 weeks, then at bedtime twice a week.    magnesium citrate 125 mg capsule 2 capsules, oral, Daily    metoprolol succinate XL (Toprol-XL) 100 mg 24 hr tablet 1 tablet, oral, Daily " "   rosuvastatin (Crestor) 20 mg tablet TAKE 1 TABLET BY MOUTH EVERY DAY    SUMAtriptan (IMITREX) 50 mg, oral, Once as needed       Physical Examination:   GENERAL:  Well developed, well nourished, in no acute distress.  HEENT: NC AT, EOMI with anicteric sclera  NECK:  Supple, no JVD, no bruit.  CHEST:  Symmetric and nontender.  LUNGS:  Clear to auscultation bilaterally, normal respiratory effort.  HEART: PMI is nonpalpable.  There is a regular rhythm this mildly bradycardic with a normal S1-2.  No S3.  There is a 1/6 systolic crescendo decrescendo murmur at the early peaking heard at the right upper sternal border.  No diastolic murmur is heard.  Carotid upstrokes are normal without bruits and there is no abdominal or femoral bruits either.  Pedal pulses are normal without edema.    ABDOMEN: Soft, NT, ND without palpable organomegaly or bruits  EXTREMITIES:  Warm with good color, no clubbing or cyanosis.  There is no edema noted.  PERIPHERAL VASCULAR:  Pulses present and equally palpable; 2+ throughout.  MUSCULOSKELETAL:   NEURO/PSYCH:  Alert and oriented times three with approppriate behavior and responses. Nonfocal motor examination with normal gait and ambulation  Lymph: No significant palpable lymphadenopathy  Skin: no rash or lesions on exposed skin or reported.    Lab:     CBC:   Lab Results   Component Value Date    WBC 9.3 09/20/2023    RBC 4.42 09/20/2023    HGB 13.9 09/20/2023    HCT 42.0 09/20/2023     09/20/2023        CMP:    Lab Results   Component Value Date     09/20/2023    K 5.0 09/20/2023     09/20/2023    CO2 25 09/20/2023    BUN 17 09/20/2023    CREATININE 0.85 09/20/2023    GLUCOSE 86 09/20/2023    CALCIUM 10.1 09/20/2023       Magnesium:    No results found for: \"MG\"    Lipid Profile:    Lab Results   Component Value Date    TRIG 175 (H) 09/20/2023    HDL 59.3 09/20/2023       TSH:    Lab Results   Component Value Date    TSH 2.96 09/20/2023       BNP:   No results found " "for: \"BNP\"     PT/INR:    Lab Results   Component Value Date    PROTIME 11.2 11/06/2021    INR 1.0 11/06/2021       HgBA1c:    Lab Results   Component Value Date    HGBA1C 5.0 10/13/2022       BMP:  Lab Results   Component Value Date     09/20/2023     05/09/2023     10/13/2022    K 5.0 09/20/2023    K 4.0 05/09/2023    K 4.7 10/13/2022     09/20/2023     05/09/2023     10/13/2022    CO2 25 09/20/2023    CO2 32 05/09/2023    CO2 28 10/13/2022    BUN 17 09/20/2023    BUN 19 05/09/2023    BUN 16 10/13/2022    CREATININE 0.85 09/20/2023    CREATININE 0.69 05/09/2023    CREATININE 0.75 10/13/2022       Cardiac Enzymes:    No results found for: \"TROPHS\"    Hepatic Function Panel:    Lab Results   Component Value Date    ALKPHOS 82 09/20/2023    ALT 16 09/20/2023    AST 28 09/20/2023    PROT 6.9 09/20/2023    BILITOT 0.5 09/20/2023         Diagnostic Studies:     No echocardiogram results found for the past 12 months    No nuclear medicine results found for the past 12 months    No valid procedures specified.    EKG:   No results found for: \"EKG\"  EKG today shows sinus bradycardia and is otherwise normal.  Radiology:     No orders to display         ASSESSMENT     Problem List Items Addressed This Visit       Benign essential HTN    Relevant Medications    metoprolol succinate XL (Toprol-XL) 50 mg 24 hr tablet    Bicuspid aortic valve    Relevant Medications    metoprolol succinate XL (Toprol-XL) 50 mg 24 hr tablet    Mild aortic stenosis - Primary    Relevant Medications    metoprolol succinate XL (Toprol-XL) 50 mg 24 hr tablet    Pure hypercholesterolemia    PVC (premature ventricular contraction)    Relevant Medications    metoprolol succinate XL (Toprol-XL) 50 mg 24 hr tablet    Other Relevant Orders    ECG 12 lead (Clinic Performed)    BMI 28.0-28.9,adult       PLAN     1.  History of benign essential hypertension.  Patient's blood pressures are actually on the low side.  We will " decrease her dose of metoprolol and if it remains low we can decrease it further.  She knows to remain hydrated.  2.  Mild aortic stenosis.  Bicuspid aortic valve.  Patient has a functionally bicuspid aortic valve with fusion of 2 cusps.  MRI was done in 2020 diagnosing the same.  It does not appear from her history or examination that this is progressed to any significant degree so we will continue to follow it clinically.  3.  History of palpitations and PVCs.  Well-controlled on her current medical regimen she knows to call me if it significantly changes when she decreases her beta-blockers.  4.  Elevated LDL cholesterol.  At the time we will not adjust or start any statin therapy as the patient's CT cardiac score is 0.  We will continue with diet and exercise.  5.  Tobacco and weight status.  The patient is a non-smoker and mildly overweight.  She has successfully lost a large amount of weight following her surgery and we have congratulated her on her efforts.    I will see the patient in follow-up in a years time unless she has any problems and she can be seen sooner

## 2023-11-09 NOTE — PATIENT INSTRUCTIONS
1 year follow-up  Cut Metoprolol in 1/2 to take 50 mg daily  If BP is still low and HR low can cut the new 50 mg tablets in 1/2 and take 25 mg. Let me know if you still have low blood pressures or any issues.

## 2023-11-10 ENCOUNTER — OFFICE VISIT (OUTPATIENT)
Dept: PRIMARY CARE | Facility: CLINIC | Age: 53
End: 2023-11-10
Payer: COMMERCIAL

## 2023-11-10 VITALS
BODY MASS INDEX: 28.57 KG/M2 | RESPIRATION RATE: 18 BRPM | TEMPERATURE: 97.8 F | HEART RATE: 84 BPM | WEIGHT: 177 LBS | DIASTOLIC BLOOD PRESSURE: 76 MMHG | OXYGEN SATURATION: 98 % | SYSTOLIC BLOOD PRESSURE: 122 MMHG

## 2023-11-10 DIAGNOSIS — J01.00 ACUTE NON-RECURRENT MAXILLARY SINUSITIS: Primary | ICD-10-CM

## 2023-11-10 PROCEDURE — 3074F SYST BP LT 130 MM HG: CPT | Performed by: NURSE PRACTITIONER

## 2023-11-10 PROCEDURE — 3008F BODY MASS INDEX DOCD: CPT | Performed by: NURSE PRACTITIONER

## 2023-11-10 PROCEDURE — 1036F TOBACCO NON-USER: CPT | Performed by: NURSE PRACTITIONER

## 2023-11-10 PROCEDURE — 99213 OFFICE O/P EST LOW 20 MIN: CPT | Performed by: NURSE PRACTITIONER

## 2023-11-10 PROCEDURE — 3078F DIAST BP <80 MM HG: CPT | Performed by: NURSE PRACTITIONER

## 2023-11-10 RX ORDER — AMOXICILLIN AND CLAVULANATE POTASSIUM 875; 125 MG/1; MG/1
875 TABLET, FILM COATED ORAL 2 TIMES DAILY
Qty: 20 TABLET | Refills: 0 | Status: SHIPPED | OUTPATIENT
Start: 2023-11-10 | End: 2023-11-20

## 2023-11-10 ASSESSMENT — ENCOUNTER SYMPTOMS
HEADACHES: 1
SLEEP DISTURBANCE: 1
FEVER: 0
SINUS PRESSURE: 1
NAUSEA: 0
VOMITING: 0
CHILLS: 1
FATIGUE: 0
ACTIVITY CHANGE: 0
DIARRHEA: 0
CONSTIPATION: 0
COUGH: 1
SORE THROAT: 1

## 2023-11-10 NOTE — ASSESSMENT & PLAN NOTE
Antibiotic given for acute sinusitis; Take full course until completed  Encouraged daily use of Flonase and Zyrtec for symptom support  Follow up with PCP if not improving  ER for any SOB, difficulty breathing, uncontrolled fevers or worsening of symptoms

## 2023-11-10 NOTE — PROGRESS NOTES
Subjective   Patient ID: Jolene SIGALA is a 52 y.o. female who presents for Sinusitis.    Cold symptoms x2 weeks  Bilateral ear pressure  Tender jaw  Post nasal drip  Cough  Nasal congestion/ drainage  No fever/ chills  No GI issues    OTC- tylenol sinus    Sinusitis  This is a new problem. The current episode started in the past 7 days. The problem is unchanged. There has been no fever. Associated symptoms include chills, congestion, coughing, ear pain, headaches, sinus pressure, sneezing and a sore throat. (Post nasal drainage) Treatments tried: Tylenol sinus. The treatment provided mild relief.        Review of Systems   Constitutional:  Positive for chills. Negative for activity change, fatigue and fever.   HENT:  Positive for congestion, ear pain, sinus pressure, sneezing and sore throat.    Respiratory:  Positive for cough.    Gastrointestinal:  Negative for constipation, diarrhea, nausea and vomiting.   Neurological:  Positive for headaches.   Psychiatric/Behavioral:  Positive for sleep disturbance.        Objective   /76   Pulse 84   Temp 36.6 °C (97.8 °F) (Temporal)   Resp 18   Wt 80.3 kg (177 lb)   LMP 07/20/2020   SpO2 98%   BMI 28.57 kg/m²     Physical Exam  Vitals reviewed.   Constitutional:       Appearance: Normal appearance.   HENT:      Head: Normocephalic.      Right Ear: Ear canal and external ear normal. A middle ear effusion is present.      Left Ear: Tympanic membrane, ear canal and external ear normal.      Nose: Mucosal edema and congestion present.      Right Turbinates: Swollen.      Left Turbinates: Swollen.      Mouth/Throat:      Lips: Pink.      Mouth: Mucous membranes are moist.      Pharynx: Posterior oropharyngeal erythema present.   Cardiovascular:      Rate and Rhythm: Normal rate and regular rhythm.      Pulses: Normal pulses.      Heart sounds: Normal heart sounds.   Pulmonary:      Effort: Pulmonary effort is normal.      Breath sounds: Normal breath sounds.    Musculoskeletal:         General: Normal range of motion.      Cervical back: Normal range of motion and neck supple.   Skin:     General: Skin is warm.      Capillary Refill: Capillary refill takes less than 2 seconds.   Neurological:      General: No focal deficit present.      Mental Status: She is alert and oriented to person, place, and time.   Psychiatric:         Mood and Affect: Mood normal.         Behavior: Behavior normal.         Assessment/Plan   Problem List Items Addressed This Visit             ICD-10-CM    Acute non-recurrent maxillary sinusitis - Primary J01.00     Antibiotic given for acute sinusitis; Take full course until completed  Encouraged daily use of Flonase and Zyrtec for symptom support  Follow up with PCP if not improving  ER for any SOB, difficulty breathing, uncontrolled fevers or worsening of symptoms         Relevant Medications    amoxicillin-pot clavulanate (Augmentin) 875-125 mg tablet

## 2023-11-16 ENCOUNTER — OFFICE VISIT (OUTPATIENT)
Dept: PRIMARY CARE | Facility: CLINIC | Age: 53
End: 2023-11-16
Payer: COMMERCIAL

## 2023-11-16 VITALS
TEMPERATURE: 97.6 F | OXYGEN SATURATION: 98 % | BODY MASS INDEX: 28.41 KG/M2 | WEIGHT: 176 LBS | RESPIRATION RATE: 16 BRPM | SYSTOLIC BLOOD PRESSURE: 122 MMHG | HEART RATE: 66 BPM | DIASTOLIC BLOOD PRESSURE: 70 MMHG

## 2023-11-16 DIAGNOSIS — R05.1 ACUTE COUGH: Primary | ICD-10-CM

## 2023-11-16 PROCEDURE — 3008F BODY MASS INDEX DOCD: CPT | Performed by: NURSE PRACTITIONER

## 2023-11-16 PROCEDURE — 3074F SYST BP LT 130 MM HG: CPT | Performed by: NURSE PRACTITIONER

## 2023-11-16 PROCEDURE — 87635 SARS-COV-2 COVID-19 AMP PRB: CPT

## 2023-11-16 PROCEDURE — 99213 OFFICE O/P EST LOW 20 MIN: CPT | Performed by: NURSE PRACTITIONER

## 2023-11-16 PROCEDURE — 1036F TOBACCO NON-USER: CPT | Performed by: NURSE PRACTITIONER

## 2023-11-16 PROCEDURE — 3078F DIAST BP <80 MM HG: CPT | Performed by: NURSE PRACTITIONER

## 2023-11-16 ASSESSMENT — ENCOUNTER SYMPTOMS
HEADACHES: 1
ACTIVITY CHANGE: 0
SINUS PRESSURE: 1
SINUS COMPLAINT: 1
SORE THROAT: 0
DIARRHEA: 0
NAUSEA: 0
CONSTIPATION: 0
SHORTNESS OF BREATH: 0
COUGH: 1
FATIGUE: 0
HOARSE VOICE: 0
SWOLLEN GLANDS: 0
SLEEP DISTURBANCE: 0
FEVER: 0
VOMITING: 0
APPETITE CHANGE: 0

## 2023-11-16 NOTE — ASSESSMENT & PLAN NOTE
Covid swab completed; Will follow up on results  Encouraged to stay the course until results of Covid are known  Discussed viral vs bacterial infections  If testing is negative, can stop Augmentin and start Doxy  Reviewed supportive care for cough and cold  Can use Flonase and Zyrtec for symptom support  Follow up with PCP if not improving  ER for any SOB, difficulty breathing, uncontrolled fevers or worsening of symptoms

## 2023-11-16 NOTE — PROGRESS NOTES
Subjective   Patient ID: Jolene SIGALA is a 52 y.o. female who presents for Sinus Problem.    Started feeling a little better  Woke up and Felt worse today  Cough feels like it moved to her chest  Headaches  Facial pressure  No fever or chills  No GI issues  Eating and drinking    Flonase/ Allergy Pill/ 3 more days of antibiotics/ tylenol sinus    Sinus Problem  This is a recurrent problem. The current episode started 1 to 4 weeks ago. The problem has been gradually worsening since onset. There has been no fever. Associated symptoms include congestion, coughing, headaches and sinus pressure. Pertinent negatives include no ear pain, hoarse voice, shortness of breath, sneezing, sore throat or swollen glands. Past treatments include acetaminophen. The treatment provided mild relief.        Review of Systems   Constitutional:  Negative for activity change, appetite change, fatigue and fever.   HENT:  Positive for congestion and sinus pressure. Negative for ear pain, hoarse voice, sneezing and sore throat.    Respiratory:  Positive for cough. Negative for shortness of breath.    Gastrointestinal:  Negative for constipation, diarrhea, nausea and vomiting.   Neurological:  Positive for headaches.   Psychiatric/Behavioral:  Negative for sleep disturbance.        Objective   /70 (BP Location: Left arm, Patient Position: Sitting, BP Cuff Size: Adult)   Pulse 66   Temp 36.4 °C (97.6 °F) (Temporal)   Resp 16   Wt 79.8 kg (176 lb)   LMP 07/20/2020   SpO2 98%   BMI 28.41 kg/m²     Physical Exam  Vitals reviewed.   Constitutional:       Appearance: Normal appearance. She is well-developed and well-groomed. She is not ill-appearing or toxic-appearing.   HENT:      Head: Normocephalic.      Right Ear: Tympanic membrane, ear canal and external ear normal.      Left Ear: Tympanic membrane, ear canal and external ear normal.      Nose: Mucosal edema and congestion present.      Right Turbinates: Swollen.      Left  Turbinates: Swollen.      Mouth/Throat:      Lips: Pink.      Mouth: Mucous membranes are moist.      Pharynx: Posterior oropharyngeal erythema present.   Eyes:      Extraocular Movements: Extraocular movements intact.      Conjunctiva/sclera: Conjunctivae normal.      Pupils: Pupils are equal, round, and reactive to light.   Cardiovascular:      Rate and Rhythm: Normal rate and regular rhythm.      Pulses: Normal pulses.      Heart sounds: Normal heart sounds.   Pulmonary:      Effort: Pulmonary effort is normal.      Breath sounds: Normal breath sounds.   Musculoskeletal:      Cervical back: Normal range of motion and neck supple.   Skin:     General: Skin is warm.      Capillary Refill: Capillary refill takes less than 2 seconds.   Neurological:      General: No focal deficit present.      Mental Status: She is alert and oriented to person, place, and time.   Psychiatric:         Mood and Affect: Mood normal.         Behavior: Behavior normal. Behavior is cooperative.         Assessment/Plan   Problem List Items Addressed This Visit             ICD-10-CM    Acute cough - Primary R05.1     Covid swab completed; Will follow up on results  Encouraged to stay the course until results of Covid are known  Discussed viral vs bacterial infections  If testing is negative, can stop Augmentin and start Doxy  Reviewed supportive care for cough and cold  Can use Flonase and Zyrtec for symptom support  Follow up with PCP if not improving  ER for any SOB, difficulty breathing, uncontrolled fevers or worsening of symptoms

## 2023-11-17 LAB — SARS-COV-2 RNA RESP QL NAA+PROBE: NOT DETECTED

## 2023-11-22 ENCOUNTER — CLINICAL SUPPORT (OUTPATIENT)
Dept: PRIMARY CARE | Facility: CLINIC | Age: 53
End: 2023-11-22
Payer: COMMERCIAL

## 2023-11-22 DIAGNOSIS — Z23 ENCOUNTER FOR IMMUNIZATION: ICD-10-CM

## 2023-11-22 DIAGNOSIS — Z23 INFLUENZA VACCINE NEEDED: ICD-10-CM

## 2023-11-22 PROCEDURE — 90677 PCV20 VACCINE IM: CPT | Performed by: FAMILY MEDICINE

## 2023-11-22 PROCEDURE — 90686 IIV4 VACC NO PRSV 0.5 ML IM: CPT | Performed by: FAMILY MEDICINE

## 2023-11-22 PROCEDURE — 90471 IMMUNIZATION ADMIN: CPT | Performed by: FAMILY MEDICINE

## 2023-11-22 NOTE — PROGRESS NOTES
Jolene SIGALA presented in office for flu and pneumonia vaccine nurse visit. Pt tolerated well, no side effects. Overseeing provider was Dr. Thornton.

## 2023-11-30 ENCOUNTER — APPOINTMENT (OUTPATIENT)
Dept: OBSTETRICS AND GYNECOLOGY | Facility: CLINIC | Age: 53
End: 2023-11-30
Payer: COMMERCIAL

## 2023-12-04 ENCOUNTER — TELEPHONE (OUTPATIENT)
Dept: PRIMARY CARE | Facility: CLINIC | Age: 53
End: 2023-12-04
Payer: COMMERCIAL

## 2023-12-04 DIAGNOSIS — E78.6 HYPOCHOLESTEROLEMIA: ICD-10-CM

## 2023-12-04 RX ORDER — ROSUVASTATIN CALCIUM 20 MG/1
20 TABLET, COATED ORAL DAILY
Qty: 90 TABLET | Refills: 1 | Status: SHIPPED | OUTPATIENT
Start: 2023-12-04 | End: 2024-05-28 | Stop reason: SDUPTHER

## 2023-12-04 NOTE — TELEPHONE ENCOUNTER
Requested Prescriptions     Pending Prescriptions Disp Refills    rosuvastatin (Crestor) 20 mg tablet 90 tablet 1     Sig: Take 1 tablet (20 mg) by mouth once daily.

## 2023-12-19 ENCOUNTER — APPOINTMENT (OUTPATIENT)
Dept: PRIMARY CARE | Facility: CLINIC | Age: 53
End: 2023-12-19
Payer: COMMERCIAL

## 2024-01-03 DIAGNOSIS — F41.9 ANXIETY DISORDER, UNSPECIFIED TYPE: ICD-10-CM

## 2024-01-04 RX ORDER — FLUOXETINE 10 MG/1
CAPSULE ORAL
Qty: 30 CAPSULE | Refills: 0 | Status: SHIPPED | OUTPATIENT
Start: 2024-01-04 | End: 2024-01-31

## 2024-01-31 DIAGNOSIS — F41.9 ANXIETY DISORDER, UNSPECIFIED TYPE: ICD-10-CM

## 2024-01-31 RX ORDER — FLUOXETINE 10 MG/1
CAPSULE ORAL
Qty: 30 CAPSULE | Refills: 0 | Status: SHIPPED | OUTPATIENT
Start: 2024-01-31 | End: 2024-03-04

## 2024-03-01 DIAGNOSIS — F41.9 ANXIETY DISORDER, UNSPECIFIED TYPE: ICD-10-CM

## 2024-03-03 DIAGNOSIS — G43.109 MIGRAINE WITH AURA AND WITHOUT STATUS MIGRAINOSUS, NOT INTRACTABLE: ICD-10-CM

## 2024-03-04 RX ORDER — SUMATRIPTAN 50 MG/1
TABLET, FILM COATED ORAL
Qty: 9 TABLET | Refills: 0 | Status: SHIPPED | OUTPATIENT
Start: 2024-03-04 | End: 2024-05-21 | Stop reason: SDUPTHER

## 2024-03-04 RX ORDER — FLUOXETINE 10 MG/1
CAPSULE ORAL
Qty: 30 CAPSULE | Refills: 0 | Status: SHIPPED | OUTPATIENT
Start: 2024-03-04 | End: 2024-04-02 | Stop reason: SDUPTHER

## 2024-03-28 ENCOUNTER — OFFICE VISIT (OUTPATIENT)
Dept: PRIMARY CARE | Facility: CLINIC | Age: 54
End: 2024-03-28
Payer: COMMERCIAL

## 2024-03-28 VITALS
RESPIRATION RATE: 16 BRPM | BODY MASS INDEX: 28.25 KG/M2 | OXYGEN SATURATION: 98 % | WEIGHT: 175 LBS | DIASTOLIC BLOOD PRESSURE: 84 MMHG | HEART RATE: 58 BPM | TEMPERATURE: 98 F | SYSTOLIC BLOOD PRESSURE: 122 MMHG

## 2024-03-28 DIAGNOSIS — B96.89 ACUTE BACTERIAL SINUSITIS: Primary | ICD-10-CM

## 2024-03-28 DIAGNOSIS — J01.90 ACUTE BACTERIAL SINUSITIS: Primary | ICD-10-CM

## 2024-03-28 PROCEDURE — 1036F TOBACCO NON-USER: CPT | Performed by: NURSE PRACTITIONER

## 2024-03-28 PROCEDURE — 3074F SYST BP LT 130 MM HG: CPT | Performed by: NURSE PRACTITIONER

## 2024-03-28 PROCEDURE — 99213 OFFICE O/P EST LOW 20 MIN: CPT | Performed by: NURSE PRACTITIONER

## 2024-03-28 PROCEDURE — 3079F DIAST BP 80-89 MM HG: CPT | Performed by: NURSE PRACTITIONER

## 2024-03-28 PROCEDURE — 3008F BODY MASS INDEX DOCD: CPT | Performed by: NURSE PRACTITIONER

## 2024-03-28 RX ORDER — AMOXICILLIN AND CLAVULANATE POTASSIUM 875; 125 MG/1; MG/1
875 TABLET, FILM COATED ORAL 2 TIMES DAILY
Qty: 20 TABLET | Refills: 0 | Status: SHIPPED | OUTPATIENT
Start: 2024-03-28 | End: 2024-04-07

## 2024-03-28 ASSESSMENT — ENCOUNTER SYMPTOMS
SINUS PRESSURE: 1
CHEST TIGHTNESS: 0
COUGH: 1
HEADACHES: 1
SINUS PAIN: 1
SORE THROAT: 1
SHORTNESS OF BREATH: 0
DIARRHEA: 0
RHINORRHEA: 1
NAUSEA: 0
VOMITING: 0
MYALGIAS: 0
APPETITE CHANGE: 0
WHEEZING: 0
ABDOMINAL PAIN: 0
FATIGUE: 1
CHILLS: 1
FEVER: 0

## 2024-03-28 NOTE — PROGRESS NOTES
Symptoms started a week and a half ago with headache and runny nose. Pt had a scratchy throat. Pt started on allergy medicine and flonase and it helped a little. Pt continues to have sinus pressure, congestion, post nasal drainage. Took tylenol cold and sinus and it did not help. Pt vaccinated against COVID. Pt took a COVID test on Monday and it was negative.      Review of Systems   Constitutional:  Positive for chills and fatigue. Negative for appetite change and fever.   HENT:  Positive for congestion, postnasal drip, rhinorrhea, sinus pressure, sinus pain and sore throat (scratchy).    Respiratory:  Positive for cough. Negative for chest tightness, shortness of breath and wheezing.    Cardiovascular:  Negative for chest pain.   Gastrointestinal:  Negative for abdominal pain, diarrhea, nausea and vomiting.   Musculoskeletal:  Negative for myalgias.   Neurological:  Positive for headaches.     Visit Vitals  /84   Pulse 58   Temp 36.7 °C (98 °F)   Resp 16   Wt 79.4 kg (175 lb)   LMP 07/20/2020   SpO2 98%   BMI 28.25 kg/m²   OB Status Hysterectomy   Smoking Status Never   BSA 1.92 m²        Physical Exam  Vitals reviewed.   Constitutional:       General: She is not in acute distress.     Appearance: Normal appearance. She is not ill-appearing, toxic-appearing or diaphoretic.   HENT:      Head: Atraumatic.      Right Ear: Tympanic membrane, ear canal and external ear normal.      Left Ear: Tympanic membrane, ear canal and external ear normal.      Nose: Congestion and rhinorrhea present.      Right Sinus: Maxillary sinus tenderness and frontal sinus tenderness present.      Left Sinus: Maxillary sinus tenderness and frontal sinus tenderness present.      Mouth/Throat:      Mouth: Mucous membranes are moist.      Pharynx: Oropharynx is clear. Posterior oropharyngeal erythema present. No oropharyngeal exudate.      Comments: Moderate post nasal drainage, uvula midline  Eyes:      Conjunctiva/sclera:  Conjunctivae normal.   Cardiovascular:      Rate and Rhythm: Normal rate and regular rhythm.      Heart sounds: Normal heart sounds. No murmur heard.  Pulmonary:      Effort: Pulmonary effort is normal.      Breath sounds: Normal breath sounds.      Comments: Slight cough noted  Musculoskeletal:         General: Normal range of motion.   Skin:     General: Skin is warm and dry.   Neurological:      General: No focal deficit present.      Mental Status: She is alert.   Psychiatric:         Mood and Affect: Mood normal.         Behavior: Behavior normal.     Assessment/Plan   Problem List Items Addressed This Visit    None  Visit Diagnoses         Codes    Acute bacterial sinusitis    -  Primary J01.90, B96.89    Relevant Medications    amoxicillin-pot clavulanate (Augmentin) 875-125 mg tablet        Pt started on augmentin at this time. Pt declined the need for an inhaler. Advised patient on use of humidifier and hot steam treatments. Discussed that patient is to drink plenty of fluids and stay well hydrated. Can take tylenol as needed for any fevers or discomfort. Patient can use flonase as well. Discussed that patient is to go to the ER for any chest pain, difficulty breathing, shortness of breath or new/concerning symptoms; she agreed. Pt to follow up in 2-3 days if no better despite the use of the medications.

## 2024-04-02 DIAGNOSIS — F41.9 ANXIETY DISORDER, UNSPECIFIED TYPE: ICD-10-CM

## 2024-04-02 RX ORDER — FLUOXETINE 10 MG/1
10 CAPSULE ORAL DAILY
Qty: 90 CAPSULE | Refills: 0 | Status: SHIPPED | OUTPATIENT
Start: 2024-04-02

## 2024-04-02 NOTE — TELEPHONE ENCOUNTER
Patient requests prescription below    Last Office Visit: 9/20/2023   Next Office Visit: Visit date not found     Requested Prescriptions     Pending Prescriptions Disp Refills    FLUoxetine (PROzac) 10 mg capsule 90 capsule 0     Sig: Take 1 capsule (10 mg) by mouth once daily.

## 2024-05-21 DIAGNOSIS — G43.109 MIGRAINE WITH AURA AND WITHOUT STATUS MIGRAINOSUS, NOT INTRACTABLE: ICD-10-CM

## 2024-05-21 RX ORDER — SUMATRIPTAN 50 MG/1
TABLET, FILM COATED ORAL
Qty: 9 TABLET | Refills: 0 | Status: SHIPPED | OUTPATIENT
Start: 2024-05-21

## 2024-05-21 NOTE — TELEPHONE ENCOUNTER
Patient requests prescription below    Last Office Visit: 9/20/2023   Next Office Visit: Visit date not found     Requested Prescriptions     Pending Prescriptions Disp Refills    SUMAtriptan (Imitrex) 50 mg tablet 9 tablet 0     Sig: May repeat dose once in 2 hours if no relief.  Do not exceed 2 doses in 24 hours.

## 2024-05-28 DIAGNOSIS — E78.6 HYPOCHOLESTEROLEMIA: ICD-10-CM

## 2024-05-28 RX ORDER — ROSUVASTATIN CALCIUM 20 MG/1
20 TABLET, COATED ORAL DAILY
Qty: 90 TABLET | Refills: 1 | Status: SHIPPED | OUTPATIENT
Start: 2024-05-28

## 2024-05-28 NOTE — TELEPHONE ENCOUNTER
Patient requests prescription below    Last Office Visit: 9/20/2023   Next Office Visit: Visit date not found     Requested Prescriptions     Pending Prescriptions Disp Refills    rosuvastatin (Crestor) 20 mg tablet 90 tablet 1     Sig: Take 1 tablet (20 mg) by mouth once daily.

## 2024-06-03 ENCOUNTER — OFFICE VISIT (OUTPATIENT)
Dept: CARDIOLOGY | Facility: CLINIC | Age: 54
End: 2024-06-03
Payer: COMMERCIAL

## 2024-06-03 VITALS
DIASTOLIC BLOOD PRESSURE: 78 MMHG | HEART RATE: 61 BPM | HEIGHT: 66 IN | BODY MASS INDEX: 28.28 KG/M2 | WEIGHT: 176 LBS | TEMPERATURE: 97.3 F | SYSTOLIC BLOOD PRESSURE: 124 MMHG

## 2024-06-03 DIAGNOSIS — R00.2 PALPITATIONS: ICD-10-CM

## 2024-06-03 DIAGNOSIS — R42 EPISODIC LIGHTHEADEDNESS: ICD-10-CM

## 2024-06-03 DIAGNOSIS — I35.0 MILD AORTIC STENOSIS: ICD-10-CM

## 2024-06-03 DIAGNOSIS — I10 BENIGN ESSENTIAL HTN: ICD-10-CM

## 2024-06-03 DIAGNOSIS — Q23.1 BICUSPID AORTIC VALVE (HHS-HCC): Primary | ICD-10-CM

## 2024-06-03 PROCEDURE — 99214 OFFICE O/P EST MOD 30 MIN: CPT | Performed by: INTERNAL MEDICINE

## 2024-06-03 PROCEDURE — 1036F TOBACCO NON-USER: CPT | Performed by: INTERNAL MEDICINE

## 2024-06-03 PROCEDURE — 3074F SYST BP LT 130 MM HG: CPT | Performed by: INTERNAL MEDICINE

## 2024-06-03 PROCEDURE — 3078F DIAST BP <80 MM HG: CPT | Performed by: INTERNAL MEDICINE

## 2024-06-03 PROCEDURE — 3008F BODY MASS INDEX DOCD: CPT | Performed by: INTERNAL MEDICINE

## 2024-06-03 RX ORDER — CALCIUM CARBONATE 300MG(750)
400 TABLET,CHEWABLE ORAL DAILY
COMMUNITY

## 2024-06-03 ASSESSMENT — ENCOUNTER SYMPTOMS
GASTROINTESTINAL NEGATIVE: 1
LIGHT-HEADEDNESS: 1
PSYCHIATRIC NEGATIVE: 1
CONSTITUTIONAL NEGATIVE: 1
MUSCULOSKELETAL NEGATIVE: 1
PALPITATIONS: 1
DIZZINESS: 1

## 2024-06-03 NOTE — PROGRESS NOTES
Patient:  Jolene SIGALA  YOB: 1970  MRN: [id number]       Chief Complaint/Active Symptoms:       Jolene SIGALA is a 53 y.o. female who returns today for cardiac follow-up.    Is doing well. Over the past several weeks has had more of the palpitations. Her palpitations are a feeling of her heart beating hard and at rest. No associated syncope or near syncope and lasts for several minutes. Some days has it more often.     Has had a sensation of room spinning, but outside of this feels off-balance or lightheaded. One episode occurred with dizziness or standing/moving.     Is drinking what she feels is enough fluids - and used to do 64 but is mostly doing at least 32 oz daily.     No edema. No chest pain or discomfort, no shortness of breath, orthopnea or PND.     Review of Systems   Constitutional: Negative.    HENT: Negative.     Cardiovascular:  Positive for palpitations.   Gastrointestinal: Negative.    Genitourinary: Negative.    Musculoskeletal: Negative.    Neurological:  Positive for dizziness and light-headedness.   Psychiatric/Behavioral: Negative.     All other systems reviewed and are negative.      Objective:     Vitals:    06/03/24 1103   BP: 124/78   Pulse: 61   Temp: 36.3 °C (97.3 °F)       Vitals:    06/03/24 1103   Weight: 79.8 kg (176 lb)       Allergies:     No Known Allergies       Medications:     Current Outpatient Medications   Medication Instructions    biotin 5 mg capsule 1 capsule, oral, Daily, take po as directed    CALCIUM ORAL oral    cholecalciferol (Vitamin D-3) 25 MCG (1000 UT) capsule 1 capsule, oral, 2 times daily, take PO BID    estradiol (VAGIFEM) 10 mcg, vaginal, Nightly, Insert 1 tablet into vagina at bedtime for 2 weeks, then at bedtime twice a week.    FLUoxetine (PROZAC) 10 mg, oral, Daily    magnesium oxide (MAG-OX) 400 mg, oral, Daily    metoprolol succinate XL (TOPROL-XL) 50 mg, oral, Daily, Do not crush or chew.    rosuvastatin (CRESTOR) 20 mg, oral, Daily  "   SUMAtriptan (Imitrex) 50 mg tablet Take 1 tablet by mouth 1 time if needed for migraine.       Physical Examination:   GENERAL:  Well developed, well nourished, in no acute distress.  HEENT: NC AT, EOMI with anicteric sclera  NECK:  Supple, no JVD, no bruit.  CHEST:  Symmetric and nontender.  LUNGS:  Clear to auscultation bilaterally, normal respiratory effort.  HEART: PMI is nondisplaced.  There is regular rhythm with a normal S1 and opening snap and a normal S2.  There is a soft crescendo decrescendo murmur heard at the right upper sternal border that is early to mid peaking.  No diastolic murmurs appreciated no S3.  Pulses are normal without bruits in the carotid abdominal and femoral location distal perfusion is normal without edema.  ABDOMEN: Soft, NT, ND without palpable organomegaly or bruits  EXTREMITIES:  Warm with good color, no clubbing or cyanosis.  There is no edema noted.  PERIPHERAL VASCULAR:  Pulses present and equally palpable; 2+ throughout.  MUSCULOSKELETAL: No significant joint or limb deformity  NEURO/PSYCH:  Alert and oriented times three with approppriate behavior and responses. Nonfocal motor examination with normal gait and ambulation  Lymph: No significant palpable lymphadenopathy  Skin: no rash or lesions on exposed skin or reported.    Lab:     CBC:   Lab Results   Component Value Date    WBC 9.3 09/20/2023    RBC 4.42 09/20/2023    HGB 13.9 09/20/2023    HCT 42.0 09/20/2023     09/20/2023        CMP:    Lab Results   Component Value Date     09/20/2023    K 5.0 09/20/2023     09/20/2023    CO2 25 09/20/2023    BUN 17 09/20/2023    CREATININE 0.85 09/20/2023    GLUCOSE 86 09/20/2023    CALCIUM 10.1 09/20/2023       Magnesium:    No results found for: \"MG\"    Lipid Profile:    Lab Results   Component Value Date    TRIG 175 (H) 09/20/2023    HDL 59.3 09/20/2023       TSH:    Lab Results   Component Value Date    TSH 2.96 09/20/2023       BNP:   No results found for: " "\"BNP\"     PT/INR:    Lab Results   Component Value Date    PROTIME 11.2 11/06/2021    INR 1.0 11/06/2021       HgBA1c:    Lab Results   Component Value Date    HGBA1C 5.0 10/13/2022       BMP:  Lab Results   Component Value Date     09/20/2023     05/09/2023     10/13/2022    K 5.0 09/20/2023    K 4.0 05/09/2023    K 4.7 10/13/2022     09/20/2023     05/09/2023     10/13/2022    CO2 25 09/20/2023    CO2 32 05/09/2023    CO2 28 10/13/2022    BUN 17 09/20/2023    BUN 19 05/09/2023    BUN 16 10/13/2022    CREATININE 0.85 09/20/2023    CREATININE 0.69 05/09/2023    CREATININE 0.75 10/13/2022       Cardiac Enzymes:    No results found for: \"TROPHS\"    Hepatic Function Panel:    Lab Results   Component Value Date    ALKPHOS 82 09/20/2023    ALT 16 09/20/2023    AST 28 09/20/2023    PROT 6.9 09/20/2023    BILITOT 0.5 09/20/2023         Diagnostic Studies:     No echocardiogram results found for the past 12 months  Last echocardiogram was in 2020 showing the mild aortic stenosis  No nuclear medicine results found for the past 12 months  No nuclear stress test is on record    Radiology:     Transthoracic echo (TTE) complete    (Results Pending)     ASSESSMENT     Problem List Items Addressed This Visit       Benign essential HTN    Relevant Orders    Transthoracic echo (TTE) complete    Bicuspid aortic valve (HHS-HCC) - Primary    Relevant Orders    Transthoracic echo (TTE) complete    Mild aortic stenosis    Relevant Orders    Transthoracic echo (TTE) complete    Palpitations    Relevant Orders    Transthoracic echo (TTE) complete    Episodic lightheadedness       PLAN     1.  Bicuspid aortic valve with mild aortic stenosis.  Gradients may have mildly transition.  Will check her echo at this time.  2.  Palpitations and episodic lightheadedness.  Palpitations are benign by description and she has used her Apple watch and is always gotten a normal rhythm recording.  Some of her symptoms " appear to be orthostatically mediated and her oral hydration is dropped off.  I recommend that she increase her hydration and if everything seems to quiet back down then I would follow her clinically.  If her symptoms change or she records an abnormality on her Apple watch that is not read as being normal to return for an earlier appointment and a formal monitor.  3.  Hypertension.  Blood pressures are controlled on her current medical regimen.  4.  Tobacco and weight status.  Patient is a non-smoker BMI is 28.    If her echocardiogram is unremarkable we will see her in the office in follow-up in 9 months sooner if her symptoms worsen or she has any new concerns.

## 2024-06-11 ENCOUNTER — APPOINTMENT (OUTPATIENT)
Dept: PRIMARY CARE | Facility: CLINIC | Age: 54
End: 2024-06-11
Payer: COMMERCIAL

## 2024-06-27 ENCOUNTER — HOSPITAL ENCOUNTER (OUTPATIENT)
Dept: CARDIOLOGY | Facility: CLINIC | Age: 54
Discharge: HOME | End: 2024-06-27
Payer: COMMERCIAL

## 2024-06-27 VITALS
WEIGHT: 176 LBS | SYSTOLIC BLOOD PRESSURE: 118 MMHG | DIASTOLIC BLOOD PRESSURE: 74 MMHG | HEIGHT: 66 IN | BODY MASS INDEX: 28.28 KG/M2

## 2024-06-27 DIAGNOSIS — R00.2 PALPITATIONS: ICD-10-CM

## 2024-06-27 DIAGNOSIS — I10 BENIGN ESSENTIAL HTN: ICD-10-CM

## 2024-06-27 DIAGNOSIS — I35.0 MILD AORTIC STENOSIS: ICD-10-CM

## 2024-06-27 DIAGNOSIS — Q23.1 BICUSPID AORTIC VALVE (HHS-HCC): ICD-10-CM

## 2024-06-27 LAB
AORTIC VALVE MEAN GRADIENT: 19.6 MMHG
AORTIC VALVE PEAK VELOCITY: 2.67 M/S
AV PEAK GRADIENT: 28.6 MMHG
AVA (PEAK VEL): 1.02 CM2
AVA (VTI): 0.98 CM2
EJECTION FRACTION APICAL 4 CHAMBER: 75.5
EJECTION FRACTION: 63 %
LEFT ATRIUM VOLUME AREA LENGTH INDEX BSA: 22.5 ML/M2
LEFT VENTRICLE INTERNAL DIMENSION DIASTOLE: 5.19 CM (ref 3.5–6)
LEFT VENTRICULAR OUTFLOW TRACT DIAMETER: 1.74 CM
MITRAL VALVE E/A RATIO: 1.26
RIGHT VENTRICLE PEAK SYSTOLIC PRESSURE: 24.9 MMHG

## 2024-06-27 PROCEDURE — 93306 TTE W/DOPPLER COMPLETE: CPT | Performed by: INTERNAL MEDICINE

## 2024-06-27 PROCEDURE — 93356 MYOCRD STRAIN IMG SPCKL TRCK: CPT | Performed by: INTERNAL MEDICINE

## 2024-06-27 PROCEDURE — 93306 TTE W/DOPPLER COMPLETE: CPT

## 2024-07-01 DIAGNOSIS — F41.9 ANXIETY DISORDER, UNSPECIFIED TYPE: ICD-10-CM

## 2024-07-01 RX ORDER — FLUOXETINE 10 MG/1
10 CAPSULE ORAL DAILY
Qty: 90 CAPSULE | Refills: 1 | Status: SHIPPED | OUTPATIENT
Start: 2024-07-01

## 2024-07-01 NOTE — TELEPHONE ENCOUNTER
Requested Prescriptions     Pending Prescriptions Disp Refills    FLUoxetine (PROzac) 10 mg capsule 90 capsule 1     Sig: Take 1 capsule (10 mg) by mouth once daily.

## 2024-07-02 ENCOUNTER — TELEPHONE (OUTPATIENT)
Dept: CARDIOLOGY | Facility: CLINIC | Age: 54
End: 2024-07-02
Payer: COMMERCIAL

## 2024-07-02 NOTE — TELEPHONE ENCOUNTER
----- Message from Jackie Pereira LPN sent at 7/2/2024  8:37 AM EDT -----    ----- Message -----  From: Katrina Garzon MD  Sent: 7/1/2024   5:19 PM EDT  To: Jackie Pereira LPN    Technically difficult echo, moderate aortic stenosis. Overall heart function looked normal. Follow-up as recommended at last appointment.   ----- Message -----  From: Heri, Syngo - Cardiology Results In  Sent: 6/27/2024  10:17 PM EDT  To: Katrina Garzon MD

## 2024-07-05 DIAGNOSIS — G43.109 MIGRAINE WITH AURA AND WITHOUT STATUS MIGRAINOSUS, NOT INTRACTABLE: ICD-10-CM

## 2024-07-07 ENCOUNTER — LAB REQUISITION (OUTPATIENT)
Dept: LAB | Facility: HOSPITAL | Age: 54
End: 2024-07-07
Payer: COMMERCIAL

## 2024-07-07 DIAGNOSIS — J02.9 ACUTE PHARYNGITIS, UNSPECIFIED: ICD-10-CM

## 2024-07-07 PROCEDURE — 87651 STREP A DNA AMP PROBE: CPT

## 2024-07-08 LAB — S PYO DNA THROAT QL NAA+PROBE: NOT DETECTED

## 2024-07-08 RX ORDER — SUMATRIPTAN 50 MG/1
TABLET, FILM COATED ORAL
Qty: 9 TABLET | Refills: 3 | Status: SHIPPED | OUTPATIENT
Start: 2024-07-08

## 2024-07-11 ENCOUNTER — OFFICE VISIT (OUTPATIENT)
Dept: PRIMARY CARE | Facility: CLINIC | Age: 54
End: 2024-07-11
Payer: COMMERCIAL

## 2024-07-11 VITALS
WEIGHT: 173.8 LBS | RESPIRATION RATE: 20 BRPM | TEMPERATURE: 98.1 F | SYSTOLIC BLOOD PRESSURE: 120 MMHG | DIASTOLIC BLOOD PRESSURE: 80 MMHG | HEART RATE: 63 BPM | OXYGEN SATURATION: 98 % | BODY MASS INDEX: 28.05 KG/M2

## 2024-07-11 DIAGNOSIS — J06.9 ACUTE URI: Primary | ICD-10-CM

## 2024-07-11 PROCEDURE — 1036F TOBACCO NON-USER: CPT | Performed by: FAMILY MEDICINE

## 2024-07-11 PROCEDURE — 3074F SYST BP LT 130 MM HG: CPT | Performed by: FAMILY MEDICINE

## 2024-07-11 PROCEDURE — 3008F BODY MASS INDEX DOCD: CPT | Performed by: FAMILY MEDICINE

## 2024-07-11 PROCEDURE — 3079F DIAST BP 80-89 MM HG: CPT | Performed by: FAMILY MEDICINE

## 2024-07-11 PROCEDURE — 99214 OFFICE O/P EST MOD 30 MIN: CPT | Performed by: FAMILY MEDICINE

## 2024-07-11 RX ORDER — DOXYCYCLINE 100 MG/1
100 CAPSULE ORAL 2 TIMES DAILY
Qty: 20 CAPSULE | Refills: 0 | Status: SHIPPED | OUTPATIENT
Start: 2024-07-11 | End: 2024-07-21

## 2024-07-11 ASSESSMENT — ENCOUNTER SYMPTOMS
WHEEZING: 0
SHORTNESS OF BREATH: 0
SINUS PAIN: 1
CHILLS: 1
MYALGIAS: 1
NAUSEA: 0
SORE THROAT: 0
FEVER: 0
VOMITING: 0
DIFFICULTY URINATING: 0
HEADACHES: 1
COUGH: 1
DIARRHEA: 0

## 2024-07-11 NOTE — ASSESSMENT & PLAN NOTE
Pt declines any testing today  Pt to cont steroid ,tessalon perles, mucinex and sudafed  Take atbx as directed  F/up if no improvement

## 2024-07-11 NOTE — PROGRESS NOTES
Subjective   Patient ID: Jolene SIGALA is a 53 y.o. female who presents for Cough (2-3 weeks /Saturday/Sunday morning congestion and fever/Negative for covid and strep at Urgent Care on Sunday).    Cough  This is a new problem. Episode onset: couple weeks. The problem has been gradually worsening. The problem occurs constantly. The cough is Non-productive. Associated symptoms include chills, ear congestion, ear pain, headaches, myalgias, nasal congestion and postnasal drip. Pertinent negatives include no fever, sore throat, shortness of breath or wheezing. Treatments tried: albuterol inhaler, steroid RX, tessalon perls, mucinex and sudafed. The treatment provided no relief.        Review of Systems   Constitutional:  Positive for chills. Negative for fever.        Pt seen at  this weekend, rx steroid albuterol mucinex and sudafed  Symptoms persist   HENT:  Positive for congestion, ear pain, postnasal drip and sinus pain. Negative for ear discharge and sore throat.    Respiratory:  Positive for cough. Negative for shortness of breath and wheezing.    Gastrointestinal:  Negative for diarrhea, nausea and vomiting.   Genitourinary:  Negative for difficulty urinating.   Musculoskeletal:  Positive for myalgias.   Neurological:  Positive for headaches.       Objective   /80   Pulse 63   Temp 36.7 °C (98.1 °F)   Resp 20   Wt 78.8 kg (173 lb 12.8 oz)   LMP 07/20/2020   SpO2 98%   BMI 28.05 kg/m²     Physical Exam  Vitals and nursing note reviewed.   Constitutional:       General: She is not in acute distress.     Appearance: Normal appearance.   HENT:      Head: Normocephalic and atraumatic.      Right Ear: Tympanic membrane, ear canal and external ear normal.      Left Ear: Tympanic membrane, ear canal and external ear normal.      Nose: Nose normal.      Mouth/Throat:      Mouth: Mucous membranes are moist.      Pharynx: Oropharynx is clear.   Cardiovascular:      Rate and Rhythm: Normal rate and regular  rhythm.      Heart sounds: Normal heart sounds.   Pulmonary:      Effort: Pulmonary effort is normal.      Breath sounds: Normal breath sounds.   Musculoskeletal:      Cervical back: Neck supple.   Skin:     General: Skin is warm and dry.   Neurological:      Mental Status: She is alert.         Assessment/Plan   Problem List Items Addressed This Visit             ICD-10-CM    Acute URI - Primary J06.9     Pt declines any testing today  Pt to cont steroid ,tessalon perles, mucinex and sudafed  Take atbx as directed  F/up if no improvement         Relevant Medications    doxycycline (Vibramycin) 100 mg capsule

## 2024-09-24 ENCOUNTER — APPOINTMENT (OUTPATIENT)
Dept: PRIMARY CARE | Facility: CLINIC | Age: 54
End: 2024-09-24
Payer: COMMERCIAL

## 2024-09-24 VITALS
HEIGHT: 66 IN | RESPIRATION RATE: 20 BRPM | TEMPERATURE: 97.7 F | SYSTOLIC BLOOD PRESSURE: 100 MMHG | DIASTOLIC BLOOD PRESSURE: 76 MMHG | BODY MASS INDEX: 28.93 KG/M2 | HEART RATE: 60 BPM | WEIGHT: 180 LBS

## 2024-09-24 DIAGNOSIS — I10 BENIGN ESSENTIAL HTN: ICD-10-CM

## 2024-09-24 DIAGNOSIS — Z12.31 ENCOUNTER FOR SCREENING MAMMOGRAM FOR MALIGNANT NEOPLASM OF BREAST: ICD-10-CM

## 2024-09-24 DIAGNOSIS — Z00.00 ENCOUNTER FOR ANNUAL PHYSICAL EXAM: Primary | ICD-10-CM

## 2024-09-24 DIAGNOSIS — Z23 NEED FOR VACCINATION: ICD-10-CM

## 2024-09-24 DIAGNOSIS — Z13.31 SCREENING FOR DEPRESSION: ICD-10-CM

## 2024-09-24 PROBLEM — K44.9 SLIDING HIATAL HERNIA: Status: RESOLVED | Noted: 2023-06-21 | Resolved: 2024-09-24

## 2024-09-24 PROCEDURE — 96127 BRIEF EMOTIONAL/BEHAV ASSMT: CPT | Performed by: FAMILY MEDICINE

## 2024-09-24 PROCEDURE — 99396 PREV VISIT EST AGE 40-64: CPT | Performed by: FAMILY MEDICINE

## 2024-09-24 PROCEDURE — 90471 IMMUNIZATION ADMIN: CPT | Performed by: FAMILY MEDICINE

## 2024-09-24 PROCEDURE — 90656 IIV3 VACC NO PRSV 0.5 ML IM: CPT | Performed by: FAMILY MEDICINE

## 2024-09-24 ASSESSMENT — ENCOUNTER SYMPTOMS
BRUISES/BLEEDS EASILY: 0
CONSTIPATION: 0
SHORTNESS OF BREATH: 0
BACK PAIN: 1
NERVOUS/ANXIOUS: 1
DIARRHEA: 0
DIFFICULTY URINATING: 0
NAUSEA: 0
DYSPHORIC MOOD: 0
SORE THROAT: 0
FEVER: 0
SEIZURES: 0
VOMITING: 0
PALPITATIONS: 0
ARTHRALGIAS: 1
HEMATURIA: 0
BLOOD IN STOOL: 0
RHINORRHEA: 0
WHEEZING: 0
COUGH: 0
FATIGUE: 1

## 2024-09-27 ENCOUNTER — APPOINTMENT (OUTPATIENT)
Dept: RADIOLOGY | Facility: CLINIC | Age: 54
End: 2024-09-27
Payer: COMMERCIAL

## 2024-09-27 ENCOUNTER — LAB (OUTPATIENT)
Dept: LAB | Facility: LAB | Age: 54
End: 2024-09-27
Payer: COMMERCIAL

## 2024-09-27 DIAGNOSIS — Z00.00 ENCOUNTER FOR ANNUAL PHYSICAL EXAM: ICD-10-CM

## 2024-09-27 LAB
25(OH)D3 SERPL-MCNC: 82 NG/ML (ref 30–100)
ALBUMIN SERPL BCP-MCNC: 3.9 G/DL (ref 3.4–5)
ALP SERPL-CCNC: 69 U/L (ref 33–110)
ALT SERPL W P-5'-P-CCNC: 16 U/L (ref 7–45)
ANION GAP SERPL CALC-SCNC: 9 MMOL/L (ref 10–20)
AST SERPL W P-5'-P-CCNC: 24 U/L (ref 9–39)
BILIRUB SERPL-MCNC: 0.4 MG/DL (ref 0–1.2)
BUN SERPL-MCNC: 14 MG/DL (ref 6–23)
CALCIUM SERPL-MCNC: 9.4 MG/DL (ref 8.6–10.3)
CHLORIDE SERPL-SCNC: 104 MMOL/L (ref 98–107)
CHOLEST SERPL-MCNC: 203 MG/DL (ref 0–199)
CHOLESTEROL/HDL RATIO: 3.2
CO2 SERPL-SCNC: 32 MMOL/L (ref 21–32)
CREAT SERPL-MCNC: 0.76 MG/DL (ref 0.5–1.05)
EGFRCR SERPLBLD CKD-EPI 2021: >90 ML/MIN/1.73M*2
ERYTHROCYTE [DISTWIDTH] IN BLOOD BY AUTOMATED COUNT: 12.7 % (ref 11.5–14.5)
GLUCOSE SERPL-MCNC: 82 MG/DL (ref 74–99)
HCT VFR BLD AUTO: 40.6 % (ref 36–46)
HDLC SERPL-MCNC: 63.1 MG/DL
HGB BLD-MCNC: 13.2 G/DL (ref 12–16)
IRON SATN MFR SERPL: 25 % (ref 25–45)
IRON SERPL-MCNC: 85 UG/DL (ref 35–150)
LDLC SERPL CALC-MCNC: 112 MG/DL
MCH RBC QN AUTO: 30.8 PG (ref 26–34)
MCHC RBC AUTO-ENTMCNC: 32.5 G/DL (ref 32–36)
MCV RBC AUTO: 95 FL (ref 80–100)
NON HDL CHOLESTEROL: 140 MG/DL (ref 0–149)
NRBC BLD-RTO: 0 /100 WBCS (ref 0–0)
PLATELET # BLD AUTO: 248 X10*3/UL (ref 150–450)
POTASSIUM SERPL-SCNC: 4.4 MMOL/L (ref 3.5–5.3)
PROT SERPL-MCNC: 6.3 G/DL (ref 6.4–8.2)
RBC # BLD AUTO: 4.29 X10*6/UL (ref 4–5.2)
SODIUM SERPL-SCNC: 141 MMOL/L (ref 136–145)
TIBC SERPL-MCNC: 336 UG/DL (ref 240–445)
TRIGL SERPL-MCNC: 142 MG/DL (ref 0–149)
TSH SERPL-ACNC: 3.22 MIU/L (ref 0.44–3.98)
UIBC SERPL-MCNC: 251 UG/DL (ref 110–370)
VIT B12 SERPL-MCNC: 591 PG/ML (ref 211–911)
VLDL: 28 MG/DL (ref 0–40)
WBC # BLD AUTO: 5.8 X10*3/UL (ref 4.4–11.3)

## 2024-09-27 PROCEDURE — 85027 COMPLETE CBC AUTOMATED: CPT

## 2024-09-27 PROCEDURE — 36415 COLL VENOUS BLD VENIPUNCTURE: CPT

## 2024-09-27 PROCEDURE — 82306 VITAMIN D 25 HYDROXY: CPT

## 2024-09-27 PROCEDURE — 80053 COMPREHEN METABOLIC PANEL: CPT

## 2024-09-27 PROCEDURE — 82607 VITAMIN B-12: CPT

## 2024-09-27 PROCEDURE — 84443 ASSAY THYROID STIM HORMONE: CPT

## 2024-09-27 PROCEDURE — 83550 IRON BINDING TEST: CPT

## 2024-09-27 PROCEDURE — 80061 LIPID PANEL: CPT

## 2024-09-27 PROCEDURE — 83540 ASSAY OF IRON: CPT

## 2024-10-01 ENCOUNTER — HOSPITAL ENCOUNTER (OUTPATIENT)
Dept: RADIOLOGY | Facility: CLINIC | Age: 54
Discharge: HOME | End: 2024-10-01
Payer: COMMERCIAL

## 2024-10-01 DIAGNOSIS — Z00.00 ENCOUNTER FOR ANNUAL PHYSICAL EXAM: ICD-10-CM

## 2024-10-01 PROCEDURE — 77080 DXA BONE DENSITY AXIAL: CPT

## 2024-10-01 PROCEDURE — 77080 DXA BONE DENSITY AXIAL: CPT | Performed by: RADIOLOGY

## 2024-10-07 ENCOUNTER — APPOINTMENT (OUTPATIENT)
Dept: RADIOLOGY | Facility: CLINIC | Age: 54
End: 2024-10-07
Payer: COMMERCIAL

## 2024-10-10 ENCOUNTER — HOSPITAL ENCOUNTER (OUTPATIENT)
Dept: RADIOLOGY | Facility: CLINIC | Age: 54
Discharge: HOME | End: 2024-10-10
Payer: COMMERCIAL

## 2024-10-10 VITALS — BODY MASS INDEX: 28.61 KG/M2 | HEIGHT: 66 IN | WEIGHT: 178 LBS

## 2024-10-10 DIAGNOSIS — Z12.31 ENCOUNTER FOR SCREENING MAMMOGRAM FOR MALIGNANT NEOPLASM OF BREAST: ICD-10-CM

## 2024-10-10 PROCEDURE — 77067 SCR MAMMO BI INCL CAD: CPT

## 2024-11-07 ENCOUNTER — APPOINTMENT (OUTPATIENT)
Dept: CARDIOLOGY | Facility: CLINIC | Age: 54
End: 2024-11-07
Payer: COMMERCIAL

## 2024-11-09 DIAGNOSIS — I10 BENIGN ESSENTIAL HTN: ICD-10-CM

## 2024-11-09 DIAGNOSIS — E78.6 HYPOCHOLESTEROLEMIA: ICD-10-CM

## 2024-11-09 DIAGNOSIS — I49.3 PVC (PREMATURE VENTRICULAR CONTRACTION): ICD-10-CM

## 2024-11-12 NOTE — TELEPHONE ENCOUNTER
Requested Prescriptions     Pending Prescriptions Disp Refills    rosuvastatin (Crestor) 20 mg tablet [Pharmacy Med Name: rosuvastatin 20 mg tablet] 90 tablet 3     Sig: TAKE 1 TABLET BY MOUTH ONCE DAILY    metoprolol succinate XL (Toprol-XL) 50 mg 24 hr tablet 90 tablet 3     Sig: Take 1 tablet (50 mg) by mouth once daily. Do not crush or chew.

## 2024-11-13 RX ORDER — ROSUVASTATIN CALCIUM 20 MG/1
20 TABLET, COATED ORAL DAILY
Qty: 90 TABLET | Refills: 3 | Status: SHIPPED | OUTPATIENT
Start: 2024-11-13

## 2024-11-13 RX ORDER — METOPROLOL SUCCINATE 50 MG/1
50 TABLET, EXTENDED RELEASE ORAL DAILY
Qty: 90 TABLET | Refills: 3 | Status: SHIPPED | OUTPATIENT
Start: 2024-11-13 | End: 2025-11-13

## 2024-11-14 DIAGNOSIS — N94.10 DYSPAREUNIA IN FEMALE: ICD-10-CM

## 2024-11-15 RX ORDER — ALBUTEROL SULFATE 90 UG/1
INHALANT RESPIRATORY (INHALATION)
COMMUNITY
Start: 2024-07-07

## 2024-11-16 RX ORDER — ESTRADIOL 10 UG/1
INSERT VAGINAL
Qty: 18 TABLET | Refills: 0 | Status: SHIPPED | OUTPATIENT
Start: 2024-11-16

## 2024-12-19 DIAGNOSIS — F41.9 ANXIETY DISORDER, UNSPECIFIED TYPE: ICD-10-CM

## 2024-12-19 RX ORDER — FLUOXETINE 10 MG/1
10 CAPSULE ORAL DAILY
Qty: 90 CAPSULE | Refills: 1 | Status: SHIPPED | OUTPATIENT
Start: 2024-12-19

## 2025-01-27 ENCOUNTER — DOCUMENTATION (OUTPATIENT)
Dept: SURGERY | Facility: CLINIC | Age: 55
End: 2025-01-27
Payer: COMMERCIAL

## 2025-01-27 NOTE — PROGRESS NOTES
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    I understand Dr. Zaidi has left [facility]. I am reaching out to see who I can meet with to discuss my results with Dr. Zaidi being gone.    I had maintained my weight loss for a couple years. However, I have gained 8 - 10 pounds lately and it makes me nervous. I want to discuss weight loss drugs with someone and see if this is an option for me to lose those 8 - 10 pounds and then about 15 more to get to my goal weight.    Let me know who I can reach out to.    Thank you!!  Jolene Samayoa  (was Ana Maria when I was a patient)  ----------------------------------------------------------------------------------  My Response to patient:     Elroy Fernández,     Thanks for reaching out. Yes, I am still working at [place] with Dr. Peters. For your routine bariatric care and inquiry of weight loss medications you will need to schedule an appointment with Dr. Peters's Nurse Practitioner, Lorenza Greene and also a touch base with the dieticians. Brisa will want you working with the dietician again as well. . Please contact Juliana at [phone] and request a New patient visit with Brisa to establish routine lata care and also a follow up with your Dietician Vilma.     Kindly,  Chinyere Sumner, [facility] Bariatric Surgery [place] [address]  [address]  [place] [address]  Phone # [phone]

## 2025-03-03 ENCOUNTER — APPOINTMENT (OUTPATIENT)
Dept: CARDIOLOGY | Facility: CLINIC | Age: 55
End: 2025-03-03
Payer: COMMERCIAL

## 2025-03-18 ENCOUNTER — APPOINTMENT (OUTPATIENT)
Dept: CARDIOLOGY | Facility: CLINIC | Age: 55
End: 2025-03-18
Payer: COMMERCIAL

## 2025-03-18 VITALS
HEART RATE: 66 BPM | DIASTOLIC BLOOD PRESSURE: 78 MMHG | BODY MASS INDEX: 29.25 KG/M2 | HEIGHT: 66 IN | WEIGHT: 182 LBS | SYSTOLIC BLOOD PRESSURE: 108 MMHG

## 2025-03-18 DIAGNOSIS — I10 BENIGN ESSENTIAL HTN: ICD-10-CM

## 2025-03-18 DIAGNOSIS — R00.2 PALPITATIONS: ICD-10-CM

## 2025-03-18 DIAGNOSIS — E78.00 PURE HYPERCHOLESTEROLEMIA: ICD-10-CM

## 2025-03-18 DIAGNOSIS — Q23.81 BICUSPID AORTIC VALVE: Primary | ICD-10-CM

## 2025-03-18 PROCEDURE — 1036F TOBACCO NON-USER: CPT | Performed by: INTERNAL MEDICINE

## 2025-03-18 PROCEDURE — 93000 ELECTROCARDIOGRAM COMPLETE: CPT | Performed by: INTERNAL MEDICINE

## 2025-03-18 PROCEDURE — 3078F DIAST BP <80 MM HG: CPT | Performed by: INTERNAL MEDICINE

## 2025-03-18 PROCEDURE — 99214 OFFICE O/P EST MOD 30 MIN: CPT | Performed by: INTERNAL MEDICINE

## 2025-03-18 PROCEDURE — 3074F SYST BP LT 130 MM HG: CPT | Performed by: INTERNAL MEDICINE

## 2025-03-18 PROCEDURE — 3008F BODY MASS INDEX DOCD: CPT | Performed by: INTERNAL MEDICINE

## 2025-03-18 ASSESSMENT — ENCOUNTER SYMPTOMS
RESPIRATORY NEGATIVE: 1
PALPITATIONS: 1
CONSTITUTIONAL NEGATIVE: 1
MUSCULOSKELETAL NEGATIVE: 1
PSYCHIATRIC NEGATIVE: 1
NEUROLOGICAL NEGATIVE: 1
CONSTIPATION: 1

## 2025-03-18 NOTE — PROGRESS NOTES
"  Patient:  Jolene SIGALA  YOB: 1970  MRN: [id number]       Chief Complaint/Active Symptoms:       Jolene SIGALA is a 54 y.o. female who returns today for cardiac follow-up.    Here for annual follow-up. Having more palpitations such as a racing feeling since she started taking her GLP1 inhibitor. Sometimes takes an extra magnesium which helps. No syncope or near syncope. Can take her rhythm with her apple watch but forgot about that. Lasts for several minutes.     Outside of that is doing well. Occasionally has a chest discomfort in her right upper chest without any pattern to it and no other symptoms. No shortness of breath. No exertional dizziness or lightheadedness.       Review of Systems   Constitutional: Negative.    Respiratory: Negative.     Cardiovascular:  Positive for chest pain and palpitations. Negative for leg swelling.   Gastrointestinal:  Positive for constipation.   Genitourinary: Negative.    Musculoskeletal: Negative.    Neurological: Negative.    Psychiatric/Behavioral: Negative.     All other systems reviewed and are negative.      Objective:     Visit Vitals  /78 (BP Location: Left arm, Patient Position: Sitting)   Pulse 66   Ht 1.676 m (5' 6\")   Wt 82.6 kg (182 lb)   LMP 01/01/2018 (Approximate)   BMI 29.38 kg/m²   OB Status Hysterectomy   Smoking Status Never   BSA 1.96 m²        Allergies:     No Known Allergies       Medications:     Current Outpatient Medications   Medication Instructions    albuterol 90 mcg/actuation inhaler Inhale.    biotin 5 mg capsule 1 capsule, Daily    CALCIUM ORAL Take by mouth.    cholecalciferol (Vitamin D-3) 25 MCG (1000 UT) capsule 1 capsule, 2 times daily    estradiol (Vagifem) 10 mcg tablet vaginal tablet INSERT 1 TABLET VAGINALLY EVERY DAY AT BEDTIME FOR 2 WEEKS, THEN AT BEDTIME 2 TIMES WEEKLY.    FLUoxetine (PROZAC) 10 mg, oral, Daily    magnesium oxide (MAG-OX) 400 mg, Daily    metoprolol succinate XL (TOPROL-XL) 50 mg, oral, Daily, " "Do not crush or chew.    rosuvastatin (CRESTOR) 20 mg, oral, Daily    SUMAtriptan (Imitrex) 50 mg tablet TAKE 1 TABLET BY MOUTH 1 time AS NEEDED FOR MIGRAINE       Physical Examination:   GENERAL:  Well developed, well nourished, in no acute distress.  HEENT: NC AT, EOMI with anicteric sclera  NECK:  Supple, no JVD, no bruit.  CHEST:  Symmetric and nontender.  LUNGS:  Clear to auscultation bilaterally, normal respiratory effort.  HEART:  PMI is nondisplaced.  Regular rhythm with a normal S1 and preserved S2.  There is a systolic ejection murmur heard at the upper sternal border.  Murmur is a lot less than anticipated given the moderate aortic stenosis seen on echo.  No S3 or S4, no bruits, no edema   ABDOMEN: Soft, NT, ND without palpable organomegaly or bruits  EXTREMITIES:  Warm with good color, no clubbing or cyanosis.  There is no edema noted.  PERIPHERAL VASCULAR:  Pulses present and equally palpable; 2+ throughout.  MUSCULOSKELETAL: No significant joint or limb deformity  NEURO/PSYCH:  Alert and oriented times three with approppriate behavior and responses. Nonfocal motor examination with normal gait and ambulation  Lymph: No significant palpable lymphadenopathy  Skin: no rash or lesions on exposed skin or reported.    Lab:     CBC:   Lab Results   Component Value Date    WBC 5.8 09/27/2024    RBC 4.29 09/27/2024    HGB 13.2 09/27/2024    HCT 40.6 09/27/2024     09/27/2024        CMP:    Lab Results   Component Value Date     09/27/2024    K 4.4 09/27/2024     09/27/2024    CO2 32 09/27/2024    BUN 14 09/27/2024    CREATININE 0.76 09/27/2024    GLUCOSE 82 09/27/2024    CALCIUM 9.4 09/27/2024       Magnesium:    No results found for: \"MG\"    Lipid Profile:    Lab Results   Component Value Date    TRIG 142 09/27/2024    HDL 63.1 09/27/2024    LDLCALC 112 (H) 09/27/2024       TSH:    Lab Results   Component Value Date    TSH 3.22 09/27/2024       BNP:   No results found for: \"BNP\"     PT/INR:  " "  Lab Results   Component Value Date    PROTIME 11.2 11/06/2021    INR 1.0 11/06/2021       HgBA1c:    Lab Results   Component Value Date    HGBA1C 5.0 10/13/2022       BMP:  Lab Results   Component Value Date     09/27/2024     09/20/2023     05/09/2023     10/13/2022    K 4.4 09/27/2024    K 5.0 09/20/2023    K 4.0 05/09/2023    K 4.7 10/13/2022     09/27/2024     09/20/2023     05/09/2023     10/13/2022    CO2 32 09/27/2024    CO2 25 09/20/2023    CO2 32 05/09/2023    CO2 28 10/13/2022    BUN 14 09/27/2024    BUN 17 09/20/2023    BUN 19 05/09/2023    BUN 16 10/13/2022    CREATININE 0.76 09/27/2024    CREATININE 0.85 09/20/2023    CREATININE 0.69 05/09/2023    CREATININE 0.75 10/13/2022       Cardiac Enzymes:    No results found for: \"TROPHS\"    Hepatic Function Panel:    Lab Results   Component Value Date    ALKPHOS 69 09/27/2024    ALT 16 09/27/2024    AST 24 09/27/2024    PROT 6.3 (L) 09/27/2024    BILITOT 0.4 09/27/2024     Labs reviewed    Diagnostic Studies:     Transthoracic echo (TTE) complete    Result Date: 6/27/2024                  [address], [place] [address]            Tel [phone] Fax [phone] TRANSTHORACIC ECHOCARDIOGRAM REPORT Patient Name:      BRIANNA Stringer Physician:    [id number] Katrina Garzon MD Study Date:        6/27/2024             Ordering Provider:    Slava GARZON MRN/PID:           [id number]              Fellow: Accession#:        [id number]          Nurse: Date of Birth/Age: 1970 / 53 years  Sonographer:          Belgica Christiansen                                                                RDMS, RCS, RVS Gender:            F                     Additional Staff: Height:            167.64 cm             Admit Date: Weight:   "          79.83 kg              Admission Status: BSA / BMI:         1.89 m2 / 28.41 kg/m2 Department Location:  [facility] Blood Pressure: 118 /74 mmHg Study Type:    TRANSTHORACIC ECHO (TTE) COMPLETE Diagnosis/ICD: Congenital insufficiency of aortic valve-Q23.1; Nonrheumatic                aortic (valve) stenosis-I35.0; Palpitations-R00.2 Indication:    Bicuspid AV, Mild Aortic stenosis, Palpitations, HTN, HLD,                Murmur, Light-headedness CPT Codes:     Echo Complete w Full [id number]  Study Detail: The following Echo studies were performed: 2D, M-Mode, Doppler and               color flow.  PHYSICIAN INTERPRETATION: Left Ventricle: The left ventricle was not well visualized. The left ventricular ejection fraction could not be measured. There are no regional wall motion abnormalities. The left ventricular cavity size is normal. The left ventricular septal wall thickness is normal. There is normal left ventricular posterior wall thickness. There is no evidence of left ventricular hypertrophy. No dynamic left ventricular outflow tract obstruction observed. Spectral Doppler shows a normal pattern of left ventricular diastolic filling. There is no definite left ventricular thrombus visualized. The intraventricular septum appears intact without evidence of shunting or a ventricular septal defect. Left Atrium: The left atrium is mildly dilated. There is no atrial septal defect present. Right Ventricle: The right ventricle is mildly enlarged. There is low normal right ventricular systolic function. Right Atrium: The right atrium is mildly dilated. Aortic Valve: The aortic valve was not well visualized. There is moderate aortic valve cusp calcification. There is moderate to severe aortic valve thickening. There is anterior aortic valve annular calcification. There is evidence of moderate aortic valve stenosis. The aortic valve  dimensionless index is 0.41. There is no evidence of aortic valve regurgitation. The peak instantaneous gradient of the aortic valve is 28.6 mmHg. The mean gradient of the aortic valve is 19.6 mmHg. Fusion of RCC and NCC - functionally bicuspid. Mitral Valve: The mitral valve is moderately thickened. There is no evidence of mitral valve prolapse. There is moderate mitral annular calcification. There is trace mitral valve regurgitation. Tricuspid Valve: The tricuspid valve is structurally normal. There is no evidence of tricuspid valve stenosis. There is mild tricuspid regurgitation. The Doppler estimated RVSP is within normal limits at 24.9 mmHg. Pulmonic Valve: The pulmonic valve is not well visualized. There is physiologic pulmonic valve regurgitation. Pericardium: There is no pericardial effusion noted. Aorta: The aortic root is normal. Systemic Veins: The inferior vena cava appears to be of normal size. There is IVC inspiratory collapse greater than 50%.  CONCLUSIONS:  1. Intact intraventricular septum without shunting or a ventricular septal defect.  2. No left ventricular thrombus visualized.  3. There is no evidence of left ventricular hypertrophy.  4. Mildly enlarged right ventricle.  5. There is low normal right ventricular systolic function.  6. The mitral valve is moderately thickened.  7. There is moderate mitral annular calcification.  8. No evidence of mitral valve prolapse.  9. There is No tricuspid stenosis. 10. RVSP within normal limits. 11. Moderate aortic valve stenosis. 12. There is moderate aortic valve cusp calcification. 13. There is moderate to severe aortic valve thickening. 14. The left ventricle was not well visualized. The left ventricular ejection fraction could not be measured. QUANTITATIVE DATA SUMMARY: 2D MEASUREMENTS:                           Normal Ranges: LAs:           3.84 cm    (2.7-4.0cm) RVIDd:         1.96 cm    (0.9-3.6cm) IVSd:          1.06 cm    (0.6-1.1cm) LVPWd:          0.95 cm    (0.6-1.1cm) LVIDd:         5.19 cm    (3.9-5.9cm) LVIDs:         3.53 cm LV Mass Index: 103.1 g/m2 LV % FS        32.0 % LA VOLUME:                              Normal Ranges: LA Vol A4C:       44.3 ml    (22+/-6mL/m2) LA Vol A2C:       36.5 ml LA Vol BP:        42.6 ml LA Vol Index A4C: 23.4ml/m2 LA Vol Index A2C: 19.3 ml/m2 LA Vol Index BP:  22.5 ml/m2 LA Vol A4C:       40.4 ml LA Vol A2C:       34.9 ml LV SYSTOLIC FUNCTION BY 2D PLANIMETRY (MOD):                      Normal Ranges: EF-A4C View:    75 % (>=55%) EF-Visual:      63 % LV EF Reported: 63 % LV DIASTOLIC FUNCTION:                         Normal Ranges: MV Peak E:    0.81 m/s  (0.7-1.2 m/s) MV Peak A:    0.64 m/s  (0.42-0.7 m/s) E/A Ratio:    1.26      (1.0-2.2) MV e'         0.078 m/s (>8.0) MV lateral e' 0.08 m/s MV medial e'  0.08 m/s E/e' Ratio:   10.41     (<8.0) MITRAL VALVE:                 Normal Ranges: MV DT: 147 msec (150-240msec) MITRAL INSUFFICIENCY:                      Normal Ranges: MR Vmax: 191.69 cm/s AORTIC VALVE:                                    Normal Ranges: AoV Vmax:                2.67 m/s  (<=1.7m/s) AoV Peak P.6 mmHg (<20mmHg) AoV Mean P.6 mmHg (1.7-11.5mmHg) LVOT Max Edd:            1.15 m/s  (<=1.1m/s) AoV VTI:                 72.26 cm  (18-25cm) LVOT VTI:                29.90 cm LVOT Diameter:           1.74 cm   (1.8-2.4cm) AoV Area, VTI:           0.98 cm2  (2.5-5.5cm2) AoV Area,Vmax:           1.02 cm2  (2.5-4.5cm2) AoV Dimensionless Index: 0.41  RIGHT VENTRICLE: RV Major 5.2 cm TRICUSPID VALVE/RVSP:                             Normal Ranges: Peak TR Velocity: 2.23 m/s Est. RA Pressure: 5 mmHg RV Syst Pressure: 24.9 mmHg (< 30mmHg) IVC Diam:         1.40 cm PULMONIC VALVE:                      Normal Ranges: PV Max Edd: 0.8 m/s  (0.6-0.9m/s) PV Max P.4 mmHg AORTA: Asc Ao Diam 3.18 cm  [id number] Katrina Garzon MD Electronically signed on 2024 at 10:17:23 PM  ** Final  **      Echo reviewed - moderate aortic stenosis.     EKG:   EKG today, NSR, normal EKG    Radiology:     No orders to display     ASSESSMENT     Problem List Items Addressed This Visit       Benign essential HTN    Relevant Orders    ECG 12 lead (Clinic Performed) (Completed)    Bicuspid aortic valve - Primary    Pure hypercholesterolemia    Palpitations       PLAN     1.  Bicuspid aortic valve with mild to moderate aortic stenosis.  Echocardiogram last summer suggested gradients with moderate aortic stenosis but murmur really is underwhelming.  Will continue to follow her clinically.  At this time no indications for valve replacement.  2.  Hypertension.  If anything blood pressures are little on the low side we have encouraged her to make sure she maintains hydration.  3.  Palpitations.  Palpitations are infrequent.  We have reminded her of the features of her Apple watch that she can record her heart rate and rhythm when she has the symptoms that she can also set up automatic alerts for high heart rate alarms and irregular heartbeats.  If she gets any concerning abnormalities with her symptoms she will come back and show me those tracings.  4.  Hypercholesterolemia.  Last lipid panel was reasonable.  Will await this falls labs.    Will see the patient in follow-up in a year as long as she has no change in her symptoms we will see her sooner at any time if there is any abnormalities on her rhythm or she develops new symptoms.

## 2025-06-24 NOTE — PROGRESS NOTES
"Subjective   Patient ID: Jolene SIGALA is a 53 y.o. female who presents for Annual Exam.    HPI     Review of Systems   Constitutional:  Positive for fatigue. Negative for fever.   HENT:  Negative for congestion, ear pain, hearing loss, rhinorrhea and sore throat.    Eyes:  Negative for visual disturbance.   Respiratory:  Negative for cough, shortness of breath and wheezing.    Cardiovascular:  Negative for chest pain and palpitations.   Gastrointestinal:  Negative for blood in stool, constipation, diarrhea, nausea and vomiting.   Genitourinary:  Negative for difficulty urinating, hematuria, vaginal discharge and vaginal pain.   Musculoskeletal:  Positive for arthralgias and back pain.        Has seen ortho, had PT and jt inj   Skin:  Negative for rash.   Neurological:  Negative for seizures and syncope.   Hematological:  Does not bruise/bleed easily.   Psychiatric/Behavioral:  Negative for dysphoric mood and suicidal ideas. The patient is nervous/anxious.        Objective   /76   Pulse 60   Temp 36.5 °C (97.7 °F)   Resp 20   Ht 1.676 m (5' 6\")   Wt 81.6 kg (180 lb)   LMP 07/20/2020   BMI 29.05 kg/m²     Physical Exam  Vitals and nursing note reviewed.   Constitutional:       General: She is not in acute distress.     Appearance: Normal appearance.   HENT:      Head: Normocephalic and atraumatic.      Right Ear: Tympanic membrane, ear canal and external ear normal.      Left Ear: Tympanic membrane, ear canal and external ear normal.      Nose: Nose normal.      Mouth/Throat:      Mouth: Mucous membranes are moist.      Pharynx: Oropharynx is clear.   Eyes:      Extraocular Movements: Extraocular movements intact.      Conjunctiva/sclera: Conjunctivae normal.      Pupils: Pupils are equal, round, and reactive to light.   Neck:      Vascular: No carotid bruit.   Cardiovascular:      Rate and Rhythm: Normal rate and regular rhythm.      Heart sounds: Murmur heard.   Pulmonary:      Effort: Pulmonary " Adjust meds. Discussed supportive care and return parameters.   Lab Results   Component Value Date    HGBA1C 7.7 (A) 05/23/2025       Orders:    CBC and differential; Future    Comprehensive metabolic panel; Future    TSH, 3rd generation with Free T4 reflex; Future    Lipid Panel with Direct LDL reflex; Future    Hemoglobin A1C; Future    Albumin / creatinine urine ratio; Future    PSA, Total Screen; Future     effort is normal.      Breath sounds: Normal breath sounds.   Abdominal:      General: Abdomen is flat. Bowel sounds are normal.      Palpations: Abdomen is soft.      Tenderness: There is no right CVA tenderness or left CVA tenderness.   Musculoskeletal:         General: No deformity.      Cervical back: Neck supple.   Lymphadenopathy:      Cervical: No cervical adenopathy.   Skin:     General: Skin is warm and dry.   Neurological:      General: No focal deficit present.      Mental Status: She is alert.      Sensory: No sensory deficit.      Motor: No weakness.      Gait: Gait normal.   Psychiatric:         Mood and Affect: Mood normal.         Behavior: Behavior normal.         Assessment/Plan   Problem List Items Addressed This Visit             ICD-10-CM    Benign essential HTN I10     Pt on metoprolol, med well tolerated  Has been stable         Encounter for annual physical exam - Primary Z00.00    Relevant Orders    CBC    Comprehensive Metabolic Panel    Lipid Panel    TSH with reflex to Free T4 if abnormal    Vitamin D 25-Hydroxy,Total    XR DEXA bone density    Follow Up In [facility] - PCP - Health Maintenance    Vitamin B12    Iron and TIBC    Screening for depression Z13.31    Need for vaccination Z23    Relevant Orders    Flu vaccine, trivalent, preservative free, age 6 months and greater (Fluraix/Fluzone/Flulaval)    Encounter for screening mammogram for malignant neoplasm of breast Z12.31    Relevant Orders    BI mammo bilateral screening tomosynthesis        Patient reported health Good    Regular Dental Visits yes    Regular Eye Visits yes    Hearing Loss no    Balanced Diet yes    Weight Concerns no    Exercise Regular

## 2025-09-26 ENCOUNTER — APPOINTMENT (OUTPATIENT)
Dept: PRIMARY CARE | Facility: CLINIC | Age: 55
End: 2025-09-26
Payer: COMMERCIAL

## 2025-09-30 ENCOUNTER — APPOINTMENT (OUTPATIENT)
Dept: PRIMARY CARE | Facility: CLINIC | Age: 55
End: 2025-09-30
Payer: COMMERCIAL

## 2026-03-24 ENCOUNTER — APPOINTMENT (OUTPATIENT)
Dept: CARDIOLOGY | Facility: CLINIC | Age: 56
End: 2026-03-24
Payer: COMMERCIAL